# Patient Record
Sex: FEMALE | Race: WHITE | NOT HISPANIC OR LATINO | URBAN - METROPOLITAN AREA
[De-identification: names, ages, dates, MRNs, and addresses within clinical notes are randomized per-mention and may not be internally consistent; named-entity substitution may affect disease eponyms.]

---

## 2023-02-15 PROBLEM — J30.9 ALLERGIC RHINITIS: Status: ACTIVE | Noted: 2023-02-15

## 2023-02-15 PROBLEM — K57.92 ACUTE DIVERTICULITIS: Status: ACTIVE | Noted: 2023-02-15

## 2023-02-15 PROBLEM — M54.2 ANTERIOR NECK PAIN: Status: ACTIVE | Noted: 2023-02-15

## 2023-02-15 PROBLEM — E06.1 SUBACUTE THYROIDITIS: Status: ACTIVE | Noted: 2023-02-15

## 2023-02-15 PROBLEM — G57.01 PIRIFORMIS SYNDROME OF RIGHT SIDE: Status: ACTIVE | Noted: 2023-02-15

## 2023-02-15 PROBLEM — B00.9 HERPES SIMPLEX: Status: ACTIVE | Noted: 2023-02-15

## 2023-02-15 PROBLEM — R93.89 ABNORMAL THYROID ULTRASOUND: Status: ACTIVE | Noted: 2023-02-15

## 2023-02-15 PROBLEM — E03.9 ADULT HYPOTHYROIDISM: Status: ACTIVE | Noted: 2023-02-15

## 2023-02-15 PROBLEM — I10 BENIGN HYPERTENSION: Status: ACTIVE | Noted: 2023-02-15

## 2023-02-15 PROBLEM — E78.00 HYPERCHOLESTEREMIA: Status: ACTIVE | Noted: 2023-02-15

## 2023-02-15 PROBLEM — B34.9 VIRAL ILLNESS: Status: ACTIVE | Noted: 2023-02-15

## 2023-02-15 PROBLEM — M72.0 DUPUYTREN'S CONTRACTURE: Status: ACTIVE | Noted: 2023-02-15

## 2023-02-15 PROBLEM — R51.9 SINUS HEADACHE: Status: ACTIVE | Noted: 2023-02-15

## 2023-02-15 PROBLEM — M54.31 RIGHT SIDED SCIATICA: Status: ACTIVE | Noted: 2023-02-15

## 2023-02-15 PROBLEM — E04.1 THYROID NODULE: Status: ACTIVE | Noted: 2023-02-15

## 2023-02-15 PROBLEM — R07.0 THROAT DISCOMFORT: Status: ACTIVE | Noted: 2023-02-15

## 2023-02-15 PROBLEM — E66.3 OVERWEIGHT (BMI 25.0-29.9): Status: ACTIVE | Noted: 2023-02-15

## 2023-02-15 RX ORDER — CHOLECALCIFEROL (VITAMIN D3) 25 MCG
1 TABLET ORAL DAILY
COMMUNITY

## 2023-02-15 RX ORDER — METHYLPREDNISOLONE 4 MG/1
TABLET ORAL
COMMUNITY
Start: 2022-01-25 | End: 2023-04-11 | Stop reason: WASHOUT

## 2023-02-15 RX ORDER — LORATADINE 10 MG/1
1 TABLET ORAL DAILY PRN
COMMUNITY
Start: 2018-06-29

## 2023-02-15 RX ORDER — PRAVASTATIN SODIUM 10 MG/1
1 TABLET ORAL DAILY
COMMUNITY
Start: 2020-02-18 | End: 2023-04-08 | Stop reason: SDUPTHER

## 2023-02-15 RX ORDER — LEVOTHYROXINE SODIUM 50 UG/1
TABLET ORAL
COMMUNITY
Start: 2021-07-14 | End: 2023-04-08 | Stop reason: SDUPTHER

## 2023-02-15 RX ORDER — CYCLOBENZAPRINE HCL 10 MG
1 TABLET ORAL NIGHTLY
COMMUNITY
Start: 2022-01-25

## 2023-04-03 RX ORDER — SOD SULF/POT CHLORIDE/MAG SULF 1.479 G
TABLET ORAL
COMMUNITY
Start: 2022-11-30

## 2023-04-03 RX ORDER — BUTALBITAL, ACETAMINOPHEN AND CAFFEINE 300; 40; 50 MG/1; MG/1; MG/1
CAPSULE ORAL
COMMUNITY
Start: 2022-06-12

## 2023-04-03 RX ORDER — ONDANSETRON 4 MG/1
TABLET, ORALLY DISINTEGRATING ORAL
COMMUNITY
Start: 2021-05-02

## 2023-04-06 ENCOUNTER — TELEPHONE (OUTPATIENT)
Dept: PRIMARY CARE | Facility: CLINIC | Age: 63
End: 2023-04-06
Payer: MEDICARE

## 2023-04-06 NOTE — TELEPHONE ENCOUNTER
Pt coming in next week for [facility] but needed refill on  Pravastatin 10 mg. & [facility]othyroxine [facility] mcg [facility] [phone]  ty

## 2023-04-08 DIAGNOSIS — E78.00 HYPERCHOLESTEREMIA: ICD-10-CM

## 2023-04-08 DIAGNOSIS — E03.9 ADULT HYPOTHYROIDISM: Primary | ICD-10-CM

## 2023-04-08 RX ORDER — PRAVASTATIN SODIUM 10 MG/1
10 TABLET ORAL DAILY
Qty: 90 TABLET | Refills: 3 | Status: SHIPPED | OUTPATIENT
Start: 2023-04-08 | End: 2023-04-13 | Stop reason: SDUPTHER

## 2023-04-08 RX ORDER — LEVOTHYROXINE SODIUM 50 UG/1
50 TABLET ORAL
Qty: 90 TABLET | Refills: 3 | Status: SHIPPED | OUTPATIENT
Start: 2023-04-08 | End: 2023-04-11 | Stop reason: SDUPTHER

## 2023-04-11 ENCOUNTER — OFFICE VISIT (OUTPATIENT)
Dept: PRIMARY CARE | Facility: CLINIC | Age: 63
End: 2023-04-11
Payer: MEDICARE

## 2023-04-11 VITALS
DIASTOLIC BLOOD PRESSURE: 82 MMHG | HEIGHT: 60 IN | SYSTOLIC BLOOD PRESSURE: 124 MMHG | OXYGEN SATURATION: 98 % | BODY MASS INDEX: 27.52 KG/M2 | WEIGHT: 140.2 LBS | HEART RATE: 83 BPM | TEMPERATURE: 98 F

## 2023-04-11 DIAGNOSIS — E66.3 OVERWEIGHT (BMI 25.0-29.9): ICD-10-CM

## 2023-04-11 DIAGNOSIS — Z00.00 MEDICARE ANNUAL WELLNESS VISIT, INITIAL: Primary | ICD-10-CM

## 2023-04-11 DIAGNOSIS — K80.20 CALCULUS OF GALLBLADDER WITHOUT CHOLECYSTITIS WITHOUT OBSTRUCTION: ICD-10-CM

## 2023-04-11 DIAGNOSIS — E03.9 ADULT HYPOTHYROIDISM: ICD-10-CM

## 2023-04-11 DIAGNOSIS — I10 BENIGN HYPERTENSION: ICD-10-CM

## 2023-04-11 DIAGNOSIS — E78.00 HYPERCHOLESTEREMIA: ICD-10-CM

## 2023-04-11 PROCEDURE — 1036F TOBACCO NON-USER: CPT | Performed by: FAMILY MEDICINE

## 2023-04-11 PROCEDURE — 3074F SYST BP LT 130 MM HG: CPT | Performed by: FAMILY MEDICINE

## 2023-04-11 PROCEDURE — G0403 EKG FOR INITIAL PREVENT EXAM: HCPCS | Performed by: FAMILY MEDICINE

## 2023-04-11 PROCEDURE — 36415 COLL VENOUS BLD VENIPUNCTURE: CPT | Performed by: FAMILY MEDICINE

## 2023-04-11 PROCEDURE — 84443 ASSAY THYROID STIM HORMONE: CPT

## 2023-04-11 PROCEDURE — 80053 COMPREHEN METABOLIC PANEL: CPT

## 2023-04-11 PROCEDURE — 81001 URINALYSIS AUTO W/SCOPE: CPT

## 2023-04-11 PROCEDURE — 85025 COMPLETE CBC W/AUTO DIFF WBC: CPT

## 2023-04-11 PROCEDURE — G0402 INITIAL PREVENTIVE EXAM: HCPCS | Performed by: FAMILY MEDICINE

## 2023-04-11 PROCEDURE — 1160F RVW MEDS BY RX/DR IN RCRD: CPT | Performed by: FAMILY MEDICINE

## 2023-04-11 PROCEDURE — 99497 ADVNCD CARE PLAN 30 MIN: CPT | Performed by: FAMILY MEDICINE

## 2023-04-11 PROCEDURE — 80061 LIPID PANEL: CPT

## 2023-04-11 PROCEDURE — 1159F MED LIST DOCD IN RCRD: CPT | Performed by: FAMILY MEDICINE

## 2023-04-11 PROCEDURE — 99214 OFFICE O/P EST MOD 30 MIN: CPT | Performed by: FAMILY MEDICINE

## 2023-04-11 PROCEDURE — 3079F DIAST BP 80-89 MM HG: CPT | Performed by: FAMILY MEDICINE

## 2023-04-11 RX ORDER — PRAVASTATIN SODIUM 20 MG/1
TABLET ORAL
COMMUNITY
End: 2023-04-11 | Stop reason: SDUPTHER

## 2023-04-11 RX ORDER — ACETAMINOPHEN 325 MG/1
TABLET ORAL
COMMUNITY
Start: 2013-02-11

## 2023-04-11 RX ORDER — MECLIZINE HYDROCHLORIDE 25 MG/1
TABLET ORAL
COMMUNITY
Start: 2013-11-29

## 2023-04-11 RX ORDER — FLUTICASONE PROPIONATE 50 MCG
SPRAY, SUSPENSION (ML) NASAL
COMMUNITY
Start: 2012-09-26

## 2023-04-11 RX ORDER — LEVOTHYROXINE SODIUM 50 UG/1
TABLET ORAL
COMMUNITY
End: 2023-05-11 | Stop reason: SDUPTHER

## 2023-04-11 SDOH — ECONOMIC STABILITY: HOUSING INSECURITY
IN THE LAST 12 MONTHS, WAS THERE A TIME WHEN YOU DID NOT HAVE A STEADY PLACE TO SLEEP OR SLEPT IN A SHELTER (INCLUDING NOW)?: NO

## 2023-04-11 SDOH — ECONOMIC STABILITY: FOOD INSECURITY: WITHIN THE PAST 12 MONTHS, YOU WORRIED THAT YOUR FOOD WOULD RUN OUT BEFORE YOU GOT MONEY TO BUY MORE.: NEVER TRUE

## 2023-04-11 SDOH — ECONOMIC STABILITY: TRANSPORTATION INSECURITY
IN THE PAST 12 MONTHS, HAS THE LACK OF TRANSPORTATION KEPT YOU FROM MEDICAL APPOINTMENTS OR FROM GETTING MEDICATIONS?: NO

## 2023-04-11 SDOH — ECONOMIC STABILITY: TRANSPORTATION INSECURITY
IN THE PAST 12 MONTHS, HAS LACK OF TRANSPORTATION KEPT YOU FROM MEETINGS, WORK, OR FROM GETTING THINGS NEEDED FOR DAILY LIVING?: NO

## 2023-04-11 SDOH — ECONOMIC STABILITY: INCOME INSECURITY: IN THE LAST 12 MONTHS, WAS THERE A TIME WHEN YOU WERE NOT ABLE TO PAY THE MORTGAGE OR RENT ON TIME?: NO

## 2023-04-11 SDOH — ECONOMIC STABILITY: FOOD INSECURITY: WITHIN THE PAST 12 MONTHS, THE FOOD YOU BOUGHT JUST DIDN'T LAST AND YOU DIDN'T HAVE MONEY TO GET MORE.: NEVER TRUE

## 2023-04-11 ASSESSMENT — SOCIAL DETERMINANTS OF HEALTH (SDOH)
HOW HARD IS IT FOR YOU TO PAY FOR THE VERY BASICS LIKE FOOD, HOUSING, MEDICAL CARE, AND HEATING?: NOT HARD AT ALL
WITHIN THE LAST YEAR, HAVE YOU BEEN HUMILIATED OR EMOTIONALLY ABUSED IN OTHER WAYS BY YOUR PARTNER OR EX-PARTNER?: NO
WITHIN THE LAST YEAR, HAVE YOU BEEN AFRAID OF YOUR PARTNER OR EX-PARTNER?: NO
WITHIN THE LAST YEAR, HAVE YOU BEEN KICKED, HIT, SLAPPED, OR OTHERWISE PHYSICALLY HURT BY YOUR PARTNER OR EX-PARTNER?: NO
WITHIN THE LAST YEAR, HAVE TO BEEN RAPED OR FORCED TO HAVE ANY KIND OF SEXUAL ACTIVITY BY YOUR PARTNER OR EX-PARTNER?: NO

## 2023-04-11 ASSESSMENT — ENCOUNTER SYMPTOMS
RESPIRATORY NEGATIVE: 1
ARTHRALGIAS: 1
ROS GI COMMENTS: COLONOSCOPY UP-TO-DATE
OCCASIONAL FEELINGS OF UNSTEADINESS: 0
CONSTITUTIONAL NEGATIVE: 1
PSYCHIATRIC NEGATIVE: 1
BACK PAIN: 1
ENDOCRINE COMMENTS: THYROID DISEASE
CARDIOVASCULAR NEGATIVE: 1
DEPRESSION: 0
LOSS OF SENSATION IN FEET: 0
EYES NEGATIVE: 1

## 2023-04-11 ASSESSMENT — LIFESTYLE VARIABLES
HOW OFTEN DO YOU HAVE SIX OR MORE DRINKS ON ONE OCCASION: NEVER
HOW MANY STANDARD DRINKS CONTAINING ALCOHOL DO YOU HAVE ON A TYPICAL DAY: PATIENT DOES NOT DRINK
HOW OFTEN DO YOU HAVE A DRINK CONTAINING ALCOHOL: NEVER
SKIP TO QUESTIONS 9-10: 1
AUDIT-C TOTAL SCORE: 0

## 2023-04-11 ASSESSMENT — PATIENT HEALTH QUESTIONNAIRE - PHQ9
SUM OF ALL RESPONSES TO PHQ9 QUESTIONS 1 & 2: 0
1. LITTLE INTEREST OR PLEASURE IN DOING THINGS: NOT AT ALL
2. FEELING DOWN, DEPRESSED OR HOPELESS: NOT AT ALL

## 2023-04-11 NOTE — PROGRESS NOTES
Subjective   Patient ID: Jocelyne Barroso is a 63 y.o. female who presents for Annual Exam ( Assessment Welcome to medicare fasting bw/).    HPI     Review of Systems   Constitutional: Negative.    HENT: Negative.     Eyes: Negative.    Respiratory: Negative.     Cardiovascular: Negative.    Gastrointestinal:         Colonoscopy up-to-date   Endocrine:        Thyroid disease   Genitourinary: Negative.    Musculoskeletal:  Positive for arthralgias and back pain.   Psychiatric/Behavioral: Negative.         Objective   /82 (BP Location: Left arm)   Pulse 83   Temp 36.7 °C (98 °F) (Temporal)   Ht 1.524 m (5')   Wt 63.6 kg (140 lb 3.2 oz)   SpO2 98%   BMI 27.38 kg/m²     Physical Exam  Vitals and nursing note reviewed.   HENT:      Right Ear: Tympanic membrane normal.      Left Ear: Tympanic membrane normal.   Eyes:      Pupils: Pupils are equal, round, and reactive to light.   Cardiovascular:      Rate and Rhythm: Normal rate and regular rhythm.      Pulses: Normal pulses.      Heart sounds: Normal heart sounds.   Pulmonary:      Breath sounds: Normal breath sounds.   Abdominal:      Palpations: Abdomen is soft.      Comments: Some right upper quadrant tenderness without any rebound or guarding   Musculoskeletal:         General: Normal range of motion.   Neurological:      General: No focal deficit present.      Mental Status: She is alert.   Psychiatric:         Mood and Affect: Mood normal.         Behavior: Behavior normal.         Thought Content: Thought content normal.         Judgment: Judgment normal.         Assessment/Plan   Problem List Items Addressed This Visit          Circulatory    Benign hypertension    Relevant Orders    CBC and Auto Differential    Comprehensive Metabolic Panel    Urinalysis Microscopic Only       Endocrine/Metabolic    Overweight (BMI 25.0-29.9)    Adult hypothyroidism    Relevant Orders    TSH with reflex to Free T4 if abnormal       Other    Hypercholesteremia    Relevant  Orders    Lipid Panel     Other Visit Diagnoses       Medicare annual wellness visit, initial    -  Primary    Relevant Orders    ECG 12 lead    Calculus of gallbladder without cholecystitis without obstruction        Relevant Orders    US gallbladder

## 2023-04-11 NOTE — PROGRESS NOTES
Subjective   Reason for Visit: Jocelyne Barroso is an 63 y.o. female here for a Medicare Wellness visit.      Assessment Welcome to medicare fasting bw HPI    Patient Care Team:  Vitaly Boothe DO as PCP - General     Review of Systems    Objective   Vitals:  There were no vitals taken for this visit.      Physical Exam    Assessment/Plan   Problem List Items Addressed This Visit    None

## 2023-04-12 LAB
ALANINE AMINOTRANSFERASE (SGPT) (U/L) IN SER/PLAS: 28 U/L (ref 7–45)
ALBUMIN (G/DL) IN SER/PLAS: 4.4 G/DL (ref 3.4–5)
ALKALINE PHOSPHATASE (U/L) IN SER/PLAS: 116 U/L (ref 33–136)
ANION GAP IN SER/PLAS: 13 MMOL/L (ref 10–20)
ASPARTATE AMINOTRANSFERASE (SGOT) (U/L) IN SER/PLAS: 21 U/L (ref 9–39)
BASOPHILS (10*3/UL) IN BLOOD BY AUTOMATED COUNT: 0.05 X10E9/L (ref 0–0.1)
BASOPHILS/100 LEUKOCYTES IN BLOOD BY AUTOMATED COUNT: 0.8 % (ref 0–2)
BILIRUBIN TOTAL (MG/DL) IN SER/PLAS: 0.5 MG/DL (ref 0–1.2)
CALCIUM (MG/DL) IN SER/PLAS: 10.1 MG/DL (ref 8.6–10.6)
CARBON DIOXIDE, TOTAL (MMOL/L) IN SER/PLAS: 28 MMOL/L (ref 21–32)
CHLORIDE (MMOL/L) IN SER/PLAS: 106 MMOL/L (ref 98–107)
CHOLESTEROL (MG/DL) IN SER/PLAS: 248 MG/DL (ref 0–199)
CHOLESTEROL IN HDL (MG/DL) IN SER/PLAS: 61.9 MG/DL
CHOLESTEROL/HDL RATIO: 4
CREATININE (MG/DL) IN SER/PLAS: 0.68 MG/DL (ref 0.5–1.05)
EOSINOPHILS (10*3/UL) IN BLOOD BY AUTOMATED COUNT: 0.09 X10E9/L (ref 0–0.7)
EOSINOPHILS/100 LEUKOCYTES IN BLOOD BY AUTOMATED COUNT: 1.5 % (ref 0–6)
ERYTHROCYTE DISTRIBUTION WIDTH (RATIO) BY AUTOMATED COUNT: 12.9 % (ref 11.5–14.5)
ERYTHROCYTE MEAN CORPUSCULAR HEMOGLOBIN CONCENTRATION (G/DL) BY AUTOMATED: 31.2 G/DL (ref 32–36)
ERYTHROCYTE MEAN CORPUSCULAR VOLUME (FL) BY AUTOMATED COUNT: 96 FL (ref 80–100)
ERYTHROCYTES (10*6/UL) IN BLOOD BY AUTOMATED COUNT: 4.97 X10E12/L (ref 4–5.2)
GFR FEMALE: >90 ML/MIN/1.73M2
GLUCOSE (MG/DL) IN SER/PLAS: 89 MG/DL (ref 74–99)
HEMATOCRIT (%) IN BLOOD BY AUTOMATED COUNT: 47.5 % (ref 36–46)
HEMOGLOBIN (G/DL) IN BLOOD: 14.8 G/DL (ref 12–16)
IMMATURE GRANULOCYTES/100 LEUKOCYTES IN BLOOD BY AUTOMATED COUNT: 0.2 % (ref 0–0.9)
LDL: 165 MG/DL (ref 0–99)
LEUKOCYTES (10*3/UL) IN BLOOD BY AUTOMATED COUNT: 6 X10E9/L (ref 4.4–11.3)
LYMPHOCYTES (10*3/UL) IN BLOOD BY AUTOMATED COUNT: 1.79 X10E9/L (ref 1.2–4.8)
LYMPHOCYTES/100 LEUKOCYTES IN BLOOD BY AUTOMATED COUNT: 29.7 % (ref 13–44)
MONOCYTES (10*3/UL) IN BLOOD BY AUTOMATED COUNT: 0.42 X10E9/L (ref 0.1–1)
MONOCYTES/100 LEUKOCYTES IN BLOOD BY AUTOMATED COUNT: 7 % (ref 2–10)
MUCUS, URINE: NORMAL /LPF
NEUTROPHILS (10*3/UL) IN BLOOD BY AUTOMATED COUNT: 3.66 X10E9/L (ref 1.2–7.7)
NEUTROPHILS/100 LEUKOCYTES IN BLOOD BY AUTOMATED COUNT: 60.8 % (ref 40–80)
NRBC (PER 100 WBCS) BY AUTOMATED COUNT: 0 /100 WBC (ref 0–0)
PLATELETS (10*3/UL) IN BLOOD AUTOMATED COUNT: 259 X10E9/L (ref 150–450)
POTASSIUM (MMOL/L) IN SER/PLAS: 4.6 MMOL/L (ref 3.5–5.3)
PROTEIN TOTAL: 7 G/DL (ref 6.4–8.2)
RBC, URINE: NORMAL /HPF (ref 0–5)
SODIUM (MMOL/L) IN SER/PLAS: 142 MMOL/L (ref 136–145)
THYROTROPIN (MIU/L) IN SER/PLAS BY DETECTION LIMIT <= 0.05 MIU/L: 2 MIU/L (ref 0.44–3.98)
TRIGLYCERIDE (MG/DL) IN SER/PLAS: 108 MG/DL (ref 0–149)
UREA NITROGEN (MG/DL) IN SER/PLAS: 14 MG/DL (ref 6–23)
VLDL: 22 MG/DL (ref 0–40)
WBC, URINE: NORMAL /HPF (ref 0–5)

## 2023-04-13 ENCOUNTER — TELEPHONE (OUTPATIENT)
Dept: PRIMARY CARE | Facility: CLINIC | Age: 63
End: 2023-04-13
Payer: MEDICARE

## 2023-04-13 DIAGNOSIS — E78.00 HYPERCHOLESTEREMIA: ICD-10-CM

## 2023-04-13 RX ORDER — PRAVASTATIN SODIUM 10 MG/1
20 TABLET ORAL DAILY
Qty: 90 TABLET | Refills: 3 | Status: SHIPPED | OUTPATIENT
Start: 2023-04-13 | End: 2023-04-14 | Stop reason: SDUPTHER

## 2023-04-13 NOTE — TELEPHONE ENCOUNTER
Pt called and states that you will have to [facility] that you are raising the dosage on pt pravastatin.

## 2023-04-14 DIAGNOSIS — E78.00 HYPERCHOLESTEREMIA: ICD-10-CM

## 2023-04-17 RX ORDER — PRAVASTATIN SODIUM 10 MG/1
20 TABLET ORAL DAILY
Qty: 90 TABLET | Refills: 3 | Status: SHIPPED | OUTPATIENT
Start: 2023-04-17 | End: 2023-04-19 | Stop reason: SDUPTHER

## 2023-04-18 ENCOUNTER — TELEPHONE (OUTPATIENT)
Dept: PRIMARY CARE | Facility: CLINIC | Age: 63
End: 2023-04-18
Payer: MEDICARE

## 2023-04-18 DIAGNOSIS — K80.20 CALCULUS OF GALLBLADDER WITHOUT CHOLECYSTITIS WITHOUT OBSTRUCTION: Primary | ICD-10-CM

## 2023-04-19 DIAGNOSIS — E78.00 HYPERCHOLESTEREMIA: ICD-10-CM

## 2023-04-19 RX ORDER — PRAVASTATIN SODIUM 20 MG/1
20 TABLET ORAL DAILY
Qty: 90 TABLET | Refills: 3 | Status: SHIPPED | OUTPATIENT
Start: 2023-04-19 | End: 2024-04-01

## 2023-04-19 NOTE — TELEPHONE ENCOUNTER
Pt called and states that you have to send in a new [facility] for Pravastatin 20mg due to the will not fill the prescription without a new one

## 2023-05-11 DIAGNOSIS — E03.9 ADULT HYPOTHYROIDISM: Primary | ICD-10-CM

## 2023-05-11 RX ORDER — LEVOTHYROXINE SODIUM 50 UG/1
50 TABLET ORAL
Qty: 90 TABLET | Refills: 3 | Status: SHIPPED | OUTPATIENT
Start: 2023-05-11 | End: 2024-04-15

## 2023-07-25 ENCOUNTER — TELEPHONE (OUTPATIENT)
Dept: PRIMARY CARE | Facility: CLINIC | Age: 63
End: 2023-07-25
Payer: MEDICARE

## 2023-07-25 PROBLEM — M17.9 OSTEOARTHRITIS OF KNEE: Status: ACTIVE | Noted: 2023-07-25

## 2023-07-25 PROBLEM — R10.31 RIGHT LOWER QUADRANT ABDOMINAL PAIN: Status: ACTIVE | Noted: 2023-07-25

## 2023-07-25 PROBLEM — H91.90 HL (HEARING LOSS): Status: ACTIVE | Noted: 2023-07-25

## 2023-07-25 PROBLEM — K63.5 COLON POLYP: Status: ACTIVE | Noted: 2022-12-09

## 2023-07-25 PROBLEM — K82.8 GALLBLADDER SLUDGE: Status: ACTIVE | Noted: 2023-07-25

## 2023-07-25 PROBLEM — R10.11 ABDOMINAL PAIN, RUQ (RIGHT UPPER QUADRANT): Status: ACTIVE | Noted: 2023-07-25

## 2023-07-25 PROBLEM — Z86.73 HISTORY OF STROKE: Status: ACTIVE | Noted: 2023-07-25

## 2023-07-25 RX ORDER — CALCIUM CARBONATE 600 MG
1500 TABLET ORAL
COMMUNITY

## 2023-07-25 RX ORDER — PRAVASTATIN SODIUM 10 MG/1
1 TABLET ORAL DAILY
COMMUNITY
Start: 2020-02-18

## 2023-07-25 RX ORDER — MELOXICAM 7.5 MG/1
TABLET ORAL
COMMUNITY
Start: 2023-06-06

## 2023-07-25 NOTE — TELEPHONE ENCOUNTER
Pt was to you couple months ago & then to dr. Cotter & had various tests.  We do have her scheduled 8/15 but she would like to come in sooner for advise?  He did not find anything.  She is still having pain.  Please advise

## 2023-08-15 ENCOUNTER — OFFICE VISIT (OUTPATIENT)
Dept: PRIMARY CARE | Facility: CLINIC | Age: 63
End: 2023-08-15
Payer: MEDICARE

## 2023-08-15 VITALS
WEIGHT: 144 LBS | OXYGEN SATURATION: 98 % | DIASTOLIC BLOOD PRESSURE: 82 MMHG | SYSTOLIC BLOOD PRESSURE: 120 MMHG | HEIGHT: 60 IN | TEMPERATURE: 98 F | HEART RATE: 76 BPM | BODY MASS INDEX: 28.27 KG/M2

## 2023-08-15 DIAGNOSIS — K57.30 DIVERTICULOSIS OF COLON WITHOUT DIVERTICULITIS: Chronic | ICD-10-CM

## 2023-08-15 DIAGNOSIS — R10.11 ABDOMINAL PAIN, RUQ (RIGHT UPPER QUADRANT): ICD-10-CM

## 2023-08-15 DIAGNOSIS — I10 BENIGN HYPERTENSION: Primary | ICD-10-CM

## 2023-08-15 DIAGNOSIS — K82.8 GALLBLADDER SLUDGE: ICD-10-CM

## 2023-08-15 PROCEDURE — 1036F TOBACCO NON-USER: CPT | Performed by: FAMILY MEDICINE

## 2023-08-15 PROCEDURE — 1125F AMNT PAIN NOTED PAIN PRSNT: CPT | Performed by: FAMILY MEDICINE

## 2023-08-15 PROCEDURE — 3079F DIAST BP 80-89 MM HG: CPT | Performed by: FAMILY MEDICINE

## 2023-08-15 PROCEDURE — 99214 OFFICE O/P EST MOD 30 MIN: CPT | Performed by: FAMILY MEDICINE

## 2023-08-15 PROCEDURE — 1159F MED LIST DOCD IN RCRD: CPT | Performed by: FAMILY MEDICINE

## 2023-08-15 PROCEDURE — 3074F SYST BP LT 130 MM HG: CPT | Performed by: FAMILY MEDICINE

## 2023-08-15 PROCEDURE — 1160F RVW MEDS BY RX/DR IN RCRD: CPT | Performed by: FAMILY MEDICINE

## 2023-08-15 ASSESSMENT — ENCOUNTER SYMPTOMS
DEPRESSION: 0
BLOOD IN STOOL: 0
ABDOMINAL PAIN: 1
CONSTIPATION: 0
NAUSEA: 1
OCCASIONAL FEELINGS OF UNSTEADINESS: 0
DIARRHEA: 0
LOSS OF SENSATION IN FEET: 0

## 2023-08-15 ASSESSMENT — PAIN SCALES - GENERAL: PAINLEVEL: 7

## 2023-08-17 ENCOUNTER — LAB (OUTPATIENT)
Dept: LAB | Facility: LAB | Age: 63
End: 2023-08-17
Payer: MEDICARE

## 2023-08-17 DIAGNOSIS — R10.11 ABDOMINAL PAIN, RUQ (RIGHT UPPER QUADRANT): ICD-10-CM

## 2023-08-17 DIAGNOSIS — K82.8 GALLBLADDER SLUDGE: ICD-10-CM

## 2023-08-17 LAB
CREATININE (MG/DL) IN SER/PLAS: 0.79 MG/DL (ref 0.5–1.05)
GFR FEMALE: 83 ML/MIN/1.73M2

## 2023-08-17 PROCEDURE — 82565 ASSAY OF CREATININE: CPT

## 2023-08-17 PROCEDURE — 36415 COLL VENOUS BLD VENIPUNCTURE: CPT

## 2023-08-21 ENCOUNTER — TELEPHONE (OUTPATIENT)
Dept: PRIMARY CARE | Facility: CLINIC | Age: 63
End: 2023-08-21
Payer: MEDICARE

## 2023-08-21 NOTE — TELEPHONE ENCOUNTER
Pt called regarding bw done 8/17 -dr. RIVERS pt.  She is going for mri on pancreas, gall bladder, etc. On sept. 2  Please advise

## 2023-09-12 ENCOUNTER — TELEPHONE (OUTPATIENT)
Dept: PRIMARY CARE | Facility: CLINIC | Age: 63
End: 2023-09-12
Payer: MEDICARE

## 2023-09-12 NOTE — RESULT ENCOUNTER NOTE
Spoke with pt and she states that she is still having pain and does not know what to do. Pt would like to know what to do

## 2023-11-20 ENCOUNTER — TELEPHONE (OUTPATIENT)
Dept: PRIMARY CARE | Facility: CLINIC | Age: 63
End: 2023-11-20
Payer: MEDICARE

## 2023-11-20 DIAGNOSIS — U07.1 COVID: Primary | ICD-10-CM

## 2023-11-20 RX ORDER — NIRMATRELVIR AND RITONAVIR 300-100 MG
3 KIT ORAL 2 TIMES DAILY
Qty: 30 TABLET | Refills: 0 | Status: SHIPPED | OUTPATIENT
Start: 2023-11-20 | End: 2023-11-25

## 2023-11-20 NOTE — TELEPHONE ENCOUNTER
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 & was in recently but has tested positive for covid today.    Would like paxlovid to [facility]-[phone]  Allergy to amoxicillin,aspirin,cipro,doxy,erythromycin,morphine,statins.  Please advise   Ty

## 2024-01-22 ENCOUNTER — TELEPHONE (OUTPATIENT)
Dept: PRIMARY CARE | Facility: CLINIC | Age: 64
End: 2024-01-22
Payer: MEDICARE

## 2024-01-22 DIAGNOSIS — S61.209A OPEN WOUND OF FINGER, INITIAL ENCOUNTER: Primary | ICD-10-CM

## 2024-01-22 RX ORDER — SULFAMETHOXAZOLE AND TRIMETHOPRIM 800; 160 MG/1; MG/1
1 TABLET ORAL 2 TIMES DAILY
Qty: 20 TABLET | Refills: 0 | Status: SHIPPED | OUTPATIENT
Start: 2024-01-22 | End: 2024-02-01

## 2024-02-19 ENCOUNTER — TELEPHONE (OUTPATIENT)
Dept: PRIMARY CARE | Facility: CLINIC | Age: 64
End: 2024-02-19
Payer: MEDICARE

## 2024-02-19 DIAGNOSIS — J10.1 INFLUENZA A: Primary | ICD-10-CM

## 2024-02-19 RX ORDER — OSELTAMIVIR PHOSPHATE 75 MG/1
75 CAPSULE ORAL 2 TIMES DAILY
Qty: 10 CAPSULE | Refills: 0 | Status: SHIPPED | OUTPATIENT
Start: 2024-02-19 | End: 2024-02-24

## 2024-02-19 NOTE — TELEPHONE ENCOUNTER
Pt was here within the year but is having congestion, fever, chills,cough.  Around son who  Tested postive for flu a.  Was on amoxicillin for tooth infection-stopped Friday.   [phone]  Allergy to amoxicillin,aspirin,cipro,doxy,erythromycin,morphine,statins  Ty

## 2024-04-01 DIAGNOSIS — E78.00 HYPERCHOLESTEREMIA: ICD-10-CM

## 2024-04-01 RX ORDER — PRAVASTATIN SODIUM 20 MG/1
20 TABLET ORAL DAILY
Qty: 90 TABLET | Refills: 3 | Status: SHIPPED | OUTPATIENT
Start: 2024-04-01

## 2024-07-18 ENCOUNTER — APPOINTMENT (OUTPATIENT)
Dept: PRIMARY CARE | Facility: CLINIC | Age: 64
End: 2024-07-18
Payer: MEDICARE

## 2024-08-13 ENCOUNTER — APPOINTMENT (OUTPATIENT)
Dept: PRIMARY CARE | Facility: CLINIC | Age: 64
End: 2024-08-13
Payer: MEDICARE

## 2024-08-15 ENCOUNTER — APPOINTMENT (OUTPATIENT)
Dept: PRIMARY CARE | Facility: CLINIC | Age: 64
End: 2024-08-15
Payer: MEDICARE

## 2024-08-29 ENCOUNTER — TELEPHONE (OUTPATIENT)
Dept: PRIMARY CARE | Facility: CLINIC | Age: 64
End: 2024-08-29
Payer: MEDICARE

## 2024-08-29 DIAGNOSIS — N39.0 URINARY TRACT INFECTION WITHOUT HEMATURIA, SITE UNSPECIFIED: Primary | ICD-10-CM

## 2024-08-29 RX ORDER — NITROFURANTOIN 25; 75 MG/1; MG/1
100 CAPSULE ORAL 2 TIMES DAILY
Qty: 14 CAPSULE | Refills: 0 | Status: SHIPPED | OUTPATIENT
Start: 2024-08-29 | End: 2024-09-05

## 2024-08-29 NOTE — TELEPHONE ENCOUNTER
Pt called and states that she is having symptoms of a UTI and would like to know if you could place a culture. Pt states that she is also in a lot of pain and would like something sent to the pharmacy .   no

## 2024-08-30 ENCOUNTER — LAB (OUTPATIENT)
Dept: LAB | Facility: LAB | Age: 64
End: 2024-08-30
Payer: MEDICARE

## 2024-08-30 DIAGNOSIS — N39.0 URINARY TRACT INFECTION WITHOUT HEMATURIA, SITE UNSPECIFIED: ICD-10-CM

## 2024-08-30 PROCEDURE — 87086 URINE CULTURE/COLONY COUNT: CPT

## 2024-08-31 LAB — BACTERIA UR CULT: NORMAL

## 2024-10-07 ENCOUNTER — LAB (OUTPATIENT)
Dept: LAB | Facility: LAB | Age: 64
End: 2024-10-07
Payer: MEDICARE

## 2024-10-07 ENCOUNTER — APPOINTMENT (OUTPATIENT)
Dept: PRIMARY CARE | Facility: CLINIC | Age: 64
End: 2024-10-07
Payer: MEDICARE

## 2024-10-07 VITALS
HEIGHT: 60 IN | WEIGHT: 135.2 LBS | OXYGEN SATURATION: 97 % | SYSTOLIC BLOOD PRESSURE: 140 MMHG | BODY MASS INDEX: 26.55 KG/M2 | HEART RATE: 81 BPM | TEMPERATURE: 97.8 F | DIASTOLIC BLOOD PRESSURE: 80 MMHG

## 2024-10-07 DIAGNOSIS — I10 BENIGN HYPERTENSION: ICD-10-CM

## 2024-10-07 DIAGNOSIS — E03.9 ADULT HYPOTHYROIDISM: ICD-10-CM

## 2024-10-07 DIAGNOSIS — H90.0 CONDUCTIVE HEARING LOSS, BILATERAL: ICD-10-CM

## 2024-10-07 DIAGNOSIS — Z00.00 MEDICARE ANNUAL WELLNESS VISIT, INITIAL: Primary | ICD-10-CM

## 2024-10-07 DIAGNOSIS — Z00.00 MEDICARE ANNUAL WELLNESS VISIT, INITIAL: ICD-10-CM

## 2024-10-07 DIAGNOSIS — E78.00 HYPERCHOLESTEREMIA: ICD-10-CM

## 2024-10-07 DIAGNOSIS — N39.0 URINARY TRACT INFECTION WITHOUT HEMATURIA, SITE UNSPECIFIED: ICD-10-CM

## 2024-10-07 LAB
ALBUMIN SERPL BCP-MCNC: 4.8 G/DL (ref 3.4–5)
ALP SERPL-CCNC: 108 U/L (ref 33–136)
ALT SERPL W P-5'-P-CCNC: 26 U/L (ref 7–45)
ANION GAP SERPL CALC-SCNC: 12 MMOL/L (ref 10–20)
AST SERPL W P-5'-P-CCNC: 22 U/L (ref 9–39)
BASOPHILS # BLD AUTO: 0.05 X10*3/UL (ref 0–0.1)
BASOPHILS NFR BLD AUTO: 0.7 %
BILIRUB SERPL-MCNC: 0.6 MG/DL (ref 0–1.2)
BUN SERPL-MCNC: 14 MG/DL (ref 6–23)
CALCIUM SERPL-MCNC: 10.3 MG/DL (ref 8.6–10.6)
CHLORIDE SERPL-SCNC: 103 MMOL/L (ref 98–107)
CHOLEST SERPL-MCNC: 229 MG/DL (ref 0–199)
CHOLESTEROL/HDL RATIO: 4.1
CO2 SERPL-SCNC: 30 MMOL/L (ref 21–32)
CREAT SERPL-MCNC: 0.74 MG/DL (ref 0.5–1.05)
EGFRCR SERPLBLD CKD-EPI 2021: 89 ML/MIN/1.73M*2
EOSINOPHIL # BLD AUTO: 0.07 X10*3/UL (ref 0–0.7)
EOSINOPHIL NFR BLD AUTO: 1 %
ERYTHROCYTE [DISTWIDTH] IN BLOOD BY AUTOMATED COUNT: 12 % (ref 11.5–14.5)
GLUCOSE SERPL-MCNC: 99 MG/DL (ref 74–99)
HCT VFR BLD AUTO: 45.4 % (ref 36–46)
HCV AB SER QL: NONREACTIVE
HDLC SERPL-MCNC: 56.3 MG/DL
HGB BLD-MCNC: 14.9 G/DL (ref 12–16)
IMM GRANULOCYTES # BLD AUTO: 0.02 X10*3/UL (ref 0–0.7)
IMM GRANULOCYTES NFR BLD AUTO: 0.3 % (ref 0–0.9)
LDLC SERPL CALC-MCNC: 149 MG/DL
LYMPHOCYTES # BLD AUTO: 1.75 X10*3/UL (ref 1.2–4.8)
LYMPHOCYTES NFR BLD AUTO: 26 %
MCH RBC QN AUTO: 29.8 PG (ref 26–34)
MCHC RBC AUTO-ENTMCNC: 32.8 G/DL (ref 32–36)
MCV RBC AUTO: 91 FL (ref 80–100)
MONOCYTES # BLD AUTO: 0.45 X10*3/UL (ref 0.1–1)
MONOCYTES NFR BLD AUTO: 6.7 %
MUCOUS THREADS #/AREA URNS AUTO: NORMAL /LPF
NEUTROPHILS # BLD AUTO: 4.38 X10*3/UL (ref 1.2–7.7)
NEUTROPHILS NFR BLD AUTO: 65.3 %
NON HDL CHOLESTEROL: 173 MG/DL (ref 0–149)
NRBC BLD-RTO: 0 /100 WBCS (ref 0–0)
PLATELET # BLD AUTO: 312 X10*3/UL (ref 150–450)
POTASSIUM SERPL-SCNC: 4.3 MMOL/L (ref 3.5–5.3)
PROT SERPL-MCNC: 7.6 G/DL (ref 6.4–8.2)
RBC # BLD AUTO: 5 X10*6/UL (ref 4–5.2)
RBC #/AREA URNS AUTO: NORMAL /HPF
SODIUM SERPL-SCNC: 141 MMOL/L (ref 136–145)
TRIGL SERPL-MCNC: 120 MG/DL (ref 0–149)
TSH SERPL-ACNC: 2.25 MIU/L (ref 0.44–3.98)
VLDL: 24 MG/DL (ref 0–40)
WBC # BLD AUTO: 6.7 X10*3/UL (ref 4.4–11.3)
WBC #/AREA URNS AUTO: NORMAL /HPF

## 2024-10-07 PROCEDURE — 3008F BODY MASS INDEX DOCD: CPT | Performed by: FAMILY MEDICINE

## 2024-10-07 PROCEDURE — 1124F ACP DISCUSS-NO DSCNMKR DOCD: CPT | Performed by: FAMILY MEDICINE

## 2024-10-07 PROCEDURE — 86803 HEPATITIS C AB TEST: CPT

## 2024-10-07 PROCEDURE — 36415 COLL VENOUS BLD VENIPUNCTURE: CPT

## 2024-10-07 PROCEDURE — 99397 PER PM REEVAL EST PAT 65+ YR: CPT | Performed by: FAMILY MEDICINE

## 2024-10-07 PROCEDURE — 1126F AMNT PAIN NOTED NONE PRSNT: CPT | Performed by: FAMILY MEDICINE

## 2024-10-07 PROCEDURE — 1160F RVW MEDS BY RX/DR IN RCRD: CPT | Performed by: FAMILY MEDICINE

## 2024-10-07 PROCEDURE — 80061 LIPID PANEL: CPT

## 2024-10-07 PROCEDURE — 87086 URINE CULTURE/COLONY COUNT: CPT

## 2024-10-07 PROCEDURE — 84443 ASSAY THYROID STIM HORMONE: CPT

## 2024-10-07 PROCEDURE — 3077F SYST BP >= 140 MM HG: CPT | Performed by: FAMILY MEDICINE

## 2024-10-07 PROCEDURE — 99497 ADVNCD CARE PLAN 30 MIN: CPT | Performed by: FAMILY MEDICINE

## 2024-10-07 PROCEDURE — 85025 COMPLETE CBC W/AUTO DIFF WBC: CPT

## 2024-10-07 PROCEDURE — 80053 COMPREHEN METABOLIC PANEL: CPT

## 2024-10-07 PROCEDURE — 1159F MED LIST DOCD IN RCRD: CPT | Performed by: FAMILY MEDICINE

## 2024-10-07 PROCEDURE — 1036F TOBACCO NON-USER: CPT | Performed by: FAMILY MEDICINE

## 2024-10-07 PROCEDURE — 1170F FXNL STATUS ASSESSED: CPT | Performed by: FAMILY MEDICINE

## 2024-10-07 PROCEDURE — 81001 URINALYSIS AUTO W/SCOPE: CPT

## 2024-10-07 PROCEDURE — G0439 PPPS, SUBSEQ VISIT: HCPCS | Performed by: FAMILY MEDICINE

## 2024-10-07 PROCEDURE — 3079F DIAST BP 80-89 MM HG: CPT | Performed by: FAMILY MEDICINE

## 2024-10-07 RX ORDER — PENICILLIN V POTASSIUM 500 MG/1
TABLET, FILM COATED ORAL
COMMUNITY
Start: 2024-05-22 | End: 2024-10-07 | Stop reason: ALTCHOICE

## 2024-10-07 RX ORDER — AMOXICILLIN 500 MG/1
CAPSULE ORAL
COMMUNITY
Start: 2024-05-17 | End: 2024-10-07 | Stop reason: ALTCHOICE

## 2024-10-07 ASSESSMENT — GERIATRIC MINI NUTRITIONAL ASSESSMENT (MNA)
A HAS FOOD INTAKE DECLINED OVER THE PAST 3 MONTHS DUE TO LOSS OF APPETITE, DIGESTIVE PROBLEMS, CHEWING OR SWALLOWING DIFFICULTIES?: NO DECREASE IN FOOD INTAKE
E NEUROPSYCHOLOGICAL PROBLEMS: NO PSYCHOLOGICAL PROBLEMS
B WEIGHT LOSS DURING THE LAST 3 MONTHS: NO WEIGHT LOSS
C GENERAL MOBILITY: GOES OUT
D HAS SUFFERED PSYCHOLOGICAL STRESS OR ACUTE DISEASE IN THE PAST 3 MONTHS?: NO

## 2024-10-07 ASSESSMENT — ACTIVITIES OF DAILY LIVING (ADL)
BATHING: INDEPENDENT
BATHING: INDEPENDENT
NEEDS ASSISTANCE WITH FOOD: INDEPENDENT
DOING HOUSEWORK: INDEPENDENT
EATING: INDEPENDENT
USING TELEPHONE: INDEPENDENT
WALKS IN HOME: INDEPENDENT
STIL DRIVING: YES
ADEQUATE_TO_COMPLETE_ADL: YES
DRESSING YOURSELF: INDEPENDENT
FEEDING: INDEPENDENT
TOILETING: INDEPENDENT
PATIENT'S MEMORY ADEQUATE TO SAFELY COMPLETE DAILY ACTIVITIES?: YES
PREPARING MEALS: INDEPENDENT
TOILETING: INDEPENDENT
JUDGMENT_ADEQUATE_SAFELY_COMPLETE_DAILY_ACTIVITIES: YES
MANAGING FINANCES: INDEPENDENT
FEEDING YOURSELF: INDEPENDENT
GROOMING: INDEPENDENT
JUDGMENT_ADEQUATE_SAFELY_COMPLETE_DAILY_ACTIVITIES: YES
USING TRANSPORTATION: INDEPENDENT
ADEQUATE_TO_COMPLETE_ADL: YES
GROCERY SHOPPING: INDEPENDENT
TAKING MEDICATION: INDEPENDENT
DRESSING: INDEPENDENT

## 2024-10-07 ASSESSMENT — ENCOUNTER SYMPTOMS
CARDIOVASCULAR NEGATIVE: 1
LOSS OF SENSATION IN FEET: 0
NEUROLOGICAL NEGATIVE: 1
ENDOCRINE NEGATIVE: 1
GASTROINTESTINAL NEGATIVE: 1
DEPRESSION: 0
ARTHRALGIAS: 1
CONSTITUTIONAL NEGATIVE: 1
OCCASIONAL FEELINGS OF UNSTEADINESS: 0
RESPIRATORY NEGATIVE: 1
ROS GI COMMENTS: DR MAHAJAN
PSYCHIATRIC NEGATIVE: 1
ENDOCRINE COMMENTS: THYROID DISEASE

## 2024-10-07 ASSESSMENT — ANXIETY QUESTIONNAIRES
3. WORRYING TOO MUCH ABOUT DIFFERENT THINGS: NOT AT ALL
5. BEING SO RESTLESS THAT IT IS HARD TO SIT STILL: NOT AT ALL
6. BECOMING EASILY ANNOYED OR IRRITABLE: NOT AT ALL
GAD7 TOTAL SCORE: 0
7. FEELING AFRAID AS IF SOMETHING AWFUL MIGHT HAPPEN: NOT AT ALL
1. FEELING NERVOUS, ANXIOUS, OR ON EDGE: NOT AT ALL
4. TROUBLE RELAXING: NOT AT ALL
2. NOT BEING ABLE TO STOP OR CONTROL WORRYING: NOT AT ALL

## 2024-10-07 ASSESSMENT — PAIN SCALES - GENERAL: PAINLEVEL: 0-NO PAIN

## 2024-10-07 NOTE — PROGRESS NOTES
Subjective   Patient ID: Jocelyne Barroso is a 64 y.o. female who presents for Annual Exam (Annual medicare wellness fasting bw).    HPI     Review of Systems   Constitutional: Negative.    HENT:  Positive for hearing loss.    Respiratory: Negative.     Cardiovascular: Negative.    Gastrointestinal: Negative.         Dr Maher   Endocrine: Negative.         Thyroid disease   Genitourinary: Negative.         Sees gyn   Musculoskeletal:  Positive for arthralgias.   Neurological: Negative.    Psychiatric/Behavioral: Negative.         Objective   /80 (BP Location: Left arm)   Pulse 81   Temp 36.6 °C (97.8 °F) (Temporal)   Ht 1.524 m (5')   Wt 61.3 kg (135 lb 3.2 oz)   SpO2 97%   BMI 26.40 kg/m²     Physical Exam  Vitals and nursing note reviewed.   Constitutional:       Appearance: Normal appearance.   HENT:      Right Ear: Tympanic membrane normal.      Left Ear: Tympanic membrane normal.      Ears:      Comments: Bilateral hearing aids     Mouth/Throat:      Pharynx: Oropharynx is clear.   Cardiovascular:      Rate and Rhythm: Normal rate and regular rhythm.      Pulses: Normal pulses.      Heart sounds: Normal heart sounds.   Pulmonary:      Breath sounds: Normal breath sounds.   Abdominal:      Palpations: Abdomen is soft.   Musculoskeletal:         General: Normal range of motion.   Neurological:      General: No focal deficit present.      Mental Status: She is alert and oriented to person, place, and time.   Psychiatric:         Mood and Affect: Mood normal.         Behavior: Behavior normal.         Assessment/Plan patient seen here for an annual Medicare wellness exam.  We reviewed her questionnaire she is agreeable to her responses.  We did discuss advanced directives.  She has no significant difficulty with depression or anxiety.  We are drawing her lab work here today we will see her back in a year  Problem List Items Addressed This Visit             ICD-10-CM    Benign hypertension I10     Relevant Orders    CBC and Auto Differential    Comprehensive Metabolic Panel    Hypercholesteremia E78.00    Relevant Orders    Lipid Panel    Adult hypothyroidism E03.9    Relevant Orders    TSH with reflex to Free T4 if abnormal    Hearing loss of both ears H91.93     Other Visit Diagnoses         Codes    Medicare annual wellness visit, initial    -  Primary Z00.00    Relevant Orders    Hepatitis C antibody    BMI 26.0-26.9,adult     Z68.26    Urinary tract infection without hematuria, site unspecified     N39.0    Relevant Orders    Microscopic Only, Urine    Urine Culture

## 2024-10-07 NOTE — ACP (ADVANCE CARE PLANNING)
Confirming Previous Code Status:   Advance Care Planning Note     Discussion Date: 10/07/24   Discussion Participants: patient    The patient wishes to discuss Advance Care Planning today and the following is a brief summary of our discussion.     Patient has capacity to make their own medical decisions: Yes  Health Care Agent/Surrogate Decision Maker documented in chart: Yes    Documents on file and valid:  Advance Directive/Living Will: No   Health Care Power of : No  Other: none    Communication of Medical Status/Prognosis:   yes     Communication of Treatment Goals/Options:   yes     Treatment Decisions  Goals of Care: survival is paramount regardless of prognosis, treatment outcome, or burden   yes  Follow Up Plan  no  Team Members  myself  Time Statement: Total face to face time spent on advance care planning was 16 minutes with 16 minutes spent in counseling, including the explanation.    Vitaly Boothe DO  10/7/2024 9:49 AM

## 2024-10-08 LAB — BACTERIA UR CULT: NORMAL

## 2024-10-25 ENCOUNTER — TELEPHONE (OUTPATIENT)
Dept: PRIMARY CARE | Facility: CLINIC | Age: 64
End: 2024-10-25
Payer: MEDICARE

## 2024-12-04 ENCOUNTER — TELEPHONE (OUTPATIENT)
Dept: PRIMARY CARE | Facility: CLINIC | Age: 64
End: 2024-12-04
Payer: MEDICARE

## 2024-12-04 DIAGNOSIS — U07.1 COVID-19: Primary | ICD-10-CM

## 2024-12-04 RX ORDER — NIRMATRELVIR AND RITONAVIR 300-100 MG
3 KIT ORAL 2 TIMES DAILY
Qty: 30 TABLET | Refills: 0 | Status: SHIPPED | OUTPATIENT
Start: 2024-12-04 | End: 2024-12-09

## 2024-12-04 NOTE — TELEPHONE ENCOUNTER
Pt is  V & in recently but tested positive for covid today.  She would like paxlovid sent to [facility]-[phone]  Allergy to amoxicillin,aspirin,cipro,doxy,erythromycin,morphine,statins  Ty

## 2025-01-07 ENCOUNTER — TELEPHONE (OUTPATIENT)
Dept: PRIMARY CARE | Facility: CLINIC | Age: 65
End: 2025-01-07
Payer: MEDICARE

## 2025-01-07 DIAGNOSIS — K57.92 DIVERTICULITIS: Primary | ICD-10-CM

## 2025-01-07 RX ORDER — ONDANSETRON 4 MG/1
4 TABLET, FILM COATED ORAL EVERY 8 HOURS PRN
Qty: 20 TABLET | Refills: 0 | Status: SHIPPED | OUTPATIENT
Start: 2025-01-07 | End: 2025-01-14

## 2025-01-07 NOTE — TELEPHONE ENCOUNTER
Pt was in er Friday at  for diverticulitis-was given 2 abx-.  Was told to follow-up  With pcp.  Not feeling well-nausea, pain & dizzy.  You had told her to stay off flagyl since Sunday.  Please advise  Ty

## 2025-01-09 ENCOUNTER — OFFICE VISIT (OUTPATIENT)
Dept: PRIMARY CARE | Facility: CLINIC | Age: 65
End: 2025-01-09
Payer: MEDICARE

## 2025-01-09 VITALS
DIASTOLIC BLOOD PRESSURE: 74 MMHG | BODY MASS INDEX: 26 KG/M2 | HEART RATE: 65 BPM | HEIGHT: 60 IN | SYSTOLIC BLOOD PRESSURE: 112 MMHG | WEIGHT: 132.4 LBS | TEMPERATURE: 97.6 F | OXYGEN SATURATION: 97 %

## 2025-01-09 DIAGNOSIS — E03.9 ADULT HYPOTHYROIDISM: ICD-10-CM

## 2025-01-09 DIAGNOSIS — I10 BENIGN HYPERTENSION: Primary | ICD-10-CM

## 2025-01-09 DIAGNOSIS — K57.92 ACUTE DIVERTICULITIS: ICD-10-CM

## 2025-01-09 PROCEDURE — 1159F MED LIST DOCD IN RCRD: CPT | Performed by: FAMILY MEDICINE

## 2025-01-09 PROCEDURE — 1125F AMNT PAIN NOTED PAIN PRSNT: CPT | Performed by: FAMILY MEDICINE

## 2025-01-09 PROCEDURE — 99214 OFFICE O/P EST MOD 30 MIN: CPT | Performed by: FAMILY MEDICINE

## 2025-01-09 PROCEDURE — 3078F DIAST BP <80 MM HG: CPT | Performed by: FAMILY MEDICINE

## 2025-01-09 PROCEDURE — 1036F TOBACCO NON-USER: CPT | Performed by: FAMILY MEDICINE

## 2025-01-09 PROCEDURE — G2211 COMPLEX E/M VISIT ADD ON: HCPCS | Performed by: FAMILY MEDICINE

## 2025-01-09 PROCEDURE — 1160F RVW MEDS BY RX/DR IN RCRD: CPT | Performed by: FAMILY MEDICINE

## 2025-01-09 PROCEDURE — 3074F SYST BP LT 130 MM HG: CPT | Performed by: FAMILY MEDICINE

## 2025-01-09 PROCEDURE — 3008F BODY MASS INDEX DOCD: CPT | Performed by: FAMILY MEDICINE

## 2025-01-09 RX ORDER — OXYCODONE HYDROCHLORIDE AND ACETAMINOPHEN 5; 325 MG/1; MG/1
TABLET ORAL
COMMUNITY
Start: 2025-01-03

## 2025-01-09 RX ORDER — CIPROFLOXACIN HYDROCHLORIDE 500 MG/1
500 TABLET, FILM COATED ORAL
COMMUNITY
Start: 2025-01-03 | End: 2025-01-13

## 2025-01-09 RX ORDER — METRONIDAZOLE 500 MG/1
1 TABLET ORAL
COMMUNITY
Start: 2025-01-03

## 2025-01-09 ASSESSMENT — PAIN SCALES - GENERAL: PAINLEVEL_OUTOF10: 5

## 2025-01-09 ASSESSMENT — ENCOUNTER SYMPTOMS
ENDOCRINE NEGATIVE: 1
CONSTITUTIONAL NEGATIVE: 1
RESPIRATORY NEGATIVE: 1

## 2025-01-09 NOTE — PROGRESS NOTES
Subjective   Patient ID: Jocelyne Barroso is a 64 y.o. female who presents for Hospital Follow-up ( -1/3/2025 diverticulitis).    HPI     Review of Systems   Constitutional: Negative.    HENT: Negative.     Respiratory: Negative.     Gastrointestinal:         Patient was seen in [facility] emergency room on January 3 with acute diverticulitis.  She is on metronidazole and Cipro and now that she has something for nausea she is able to tolerate this medication she is feeling better   Endocrine: Negative.    Genitourinary: Negative.        Objective   /74 (BP Location: Left arm)   Pulse 65   Temp 36.4 °C (97.6 °F) (Temporal)   Ht 1.524 m (5')   Wt 60.1 kg (132 lb 6.4 oz)   SpO2 97%   BMI 25.86 kg/m²     Physical Exam  Vitals and nursing note reviewed.   Constitutional:       Appearance: Normal appearance.   Cardiovascular:      Rate and Rhythm: Normal rate and regular rhythm.      Pulses: Normal pulses.      Heart sounds: Normal heart sounds.   Pulmonary:      Breath sounds: Normal breath sounds.   Abdominal:      Palpations: Abdomen is soft.      Comments: Bilateral lower quadrant tenderness no rebound or guarding   Neurological:      General: No focal deficit present.      Mental Status: She is alert and oriented to person, place, and time.   Psychiatric:         Mood and Affect: Mood normal.         Behavior: Behavior normal.         Assessment/Plan patient seen here to follow-up after having been in the emergency room at [facility] with acute diverticulitis she is getting better she is able to eat now she has some loose stools.  I have asked her to finish her antibiotics and let me know if she does not improve.  She had a colonoscopy done in 2022.  Will see her back as needed  Problem List Items Addressed This Visit             ICD-10-CM    Acute diverticulitis K57.92    Benign hypertension - Primary I10    Adult hypothyroidism E03.9

## 2025-01-20 ENCOUNTER — TELEPHONE (OUTPATIENT)
Dept: PRIMARY CARE | Facility: CLINIC | Age: 65
End: 2025-01-20
Payer: MEDICARE

## 2025-01-20 DIAGNOSIS — K57.92 ACUTE DIVERTICULITIS: Primary | ICD-10-CM

## 2025-01-20 NOTE — TELEPHONE ENCOUNTER
Pt was here couple weeks ago for diverticulitis er follow-up.  She took abx & is still having problems.  Can you please put in referral for gastro.  Jonathan marcus?  I will fax  Ty

## 2025-01-30 ENCOUNTER — PATIENT OUTREACH (OUTPATIENT)
Dept: PRIMARY CARE | Facility: CLINIC | Age: 65
End: 2025-01-30
Payer: MEDICARE

## 2025-01-30 RX ORDER — PANTOPRAZOLE SODIUM 40 MG/1
40 TABLET, DELAYED RELEASE ORAL
COMMUNITY
Start: 2025-01-27

## 2025-01-30 RX ORDER — DICYCLOMINE HYDROCHLORIDE 10 MG/1
10 CAPSULE ORAL
COMMUNITY
Start: 2025-01-27 | End: 2025-02-26

## 2025-01-30 RX ORDER — ONDANSETRON 4 MG/1
4 TABLET, FILM COATED ORAL
COMMUNITY
Start: 2025-01-27

## 2025-01-30 RX ORDER — AMOXICILLIN AND CLAVULANATE POTASSIUM 875; 125 MG/1; MG/1
875 TABLET, FILM COATED ORAL
COMMUNITY
Start: 2025-01-27 | End: 2025-02-01

## 2025-01-30 NOTE — PROGRESS NOTES
Discharge Facility: [facility]  Discharge Diagnosis: diverticulitis, abd pain  Admission Date: 1/24/25  Discharge Date: 1/28/25    PCP Appointment Date: declined  Specialist Appointment Date: will call asap for appt  Hospital Encounter and Summary Linked: Yes  See discharge assessment below for further details    Engagement  Call Start Time: 0940 (1/30/2025 11:08 AM)    Medications  Medications reviewed with patient/caregiver?: Yes (1/30/2025 11:08 AM)  Is the patient having any side effects they believe may be caused by any medication additions or changes?: No (1/30/2025 11:08 AM)  Does the patient have all medications ordered at discharge?: Yes (1/30/2025 11:08 AM)  Care Management Interventions: Provided patient education (1/30/2025 11:08 AM)  Prescription Comments: on course of augmentin, bentyl, zofran, protonix (1/30/2025 11:08 AM)  Is the patient taking all medications as directed (includes completed medication regime)?: Yes (1/30/2025 11:08 AM)  Care Management Interventions: Provided patient education (1/30/2025 11:08 AM)  Medication Comments: No issues with medication (1/30/2025 11:08 AM)    Appointments  Does the patient have a primary care provider?: Yes (1/30/2025 11:08 AM)  Has the patient kept scheduled appointments due by today?: Yes (1/30/2025 11:08 AM)  Care Management Interventions: Advised to schedule with specialist (1/30/2025 11:08 AM)    Patient Teaching  Does the patient have access to their discharge instructions?: Yes (1/30/2025 11:08 AM)  Care Management Interventions: Reviewed instructions with patient (1/30/2025 11:08 AM)  What is the patient's perception of their health status since discharge?: Improving (1/30/2025 11:08 AM)  Is the patient/caregiver able to teach back the hierarchy of who to call/visit for symptoms/problems? PCP, Specialist, Home Health nurse, Urgent Care, ED, 911: Yes (1/30/2025 11:08 AM)  Patient/Caregiver Education Comments: Aware to seek care with and return of abd  pain (1/30/2025 11:08 AM)    Wrap Up  Wrap Up Additional Comments: Jocelyne is feeling much better since coming home, plans on following with Dr Maher. Declined appt at this time with Dr Boothe (1/30/2025 11:08 AM)  Call End Time: 0945 (1/30/2025 11:08 AM)

## 2025-03-21 DIAGNOSIS — E78.00 HYPERCHOLESTEREMIA: ICD-10-CM

## 2025-03-21 RX ORDER — PRAVASTATIN SODIUM 20 MG/1
20 TABLET ORAL DAILY
Qty: 90 TABLET | Refills: 3 | Status: SHIPPED | OUTPATIENT
Start: 2025-03-21

## 2025-04-02 ENCOUNTER — TELEPHONE (OUTPATIENT)
Dept: PRIMARY CARE | Facility: CLINIC | Age: 65
End: 2025-04-02
Payer: MEDICARE

## 2025-04-02 NOTE — TELEPHONE ENCOUNTER
C/O  Top crack of buttocks; patient has a rash, it's itchy and red since yesterday.   And today it's really irritated.  Been like that since yesterday.     Pharm:  [facility] [phone]

## 2025-04-10 DIAGNOSIS — E03.9 ADULT HYPOTHYROIDISM: ICD-10-CM

## 2025-04-10 RX ORDER — LEVOTHYROXINE SODIUM 50 UG/1
50 TABLET ORAL
Qty: 90 TABLET | Refills: 3 | Status: SHIPPED | OUTPATIENT
Start: 2025-04-10

## 2025-06-20 ENCOUNTER — APPOINTMENT (OUTPATIENT)
Dept: RADIOLOGY | Facility: HOSPITAL | Age: 65
End: 2025-06-20
Payer: MEDICARE

## 2025-06-20 ENCOUNTER — TELEPHONE (OUTPATIENT)
Dept: PRIMARY CARE | Facility: CLINIC | Age: 65
End: 2025-06-20
Payer: MEDICARE

## 2025-06-20 ENCOUNTER — HOSPITAL ENCOUNTER (OUTPATIENT)
Facility: HOSPITAL | Age: 65
Setting detail: OBSERVATION
Discharge: HOME | End: 2025-06-21
Attending: EMERGENCY MEDICINE | Admitting: STUDENT IN AN ORGANIZED HEALTH CARE EDUCATION/TRAINING PROGRAM
Payer: MEDICARE

## 2025-06-20 ENCOUNTER — APPOINTMENT (OUTPATIENT)
Dept: CARDIOLOGY | Facility: HOSPITAL | Age: 65
End: 2025-06-20
Payer: MEDICARE

## 2025-06-20 DIAGNOSIS — K21.00 GASTROESOPHAGEAL REFLUX DISEASE WITH ESOPHAGITIS WITHOUT HEMORRHAGE: ICD-10-CM

## 2025-06-20 DIAGNOSIS — R07.9 CHEST PAIN, UNSPECIFIED TYPE: Primary | ICD-10-CM

## 2025-06-20 LAB
ALBUMIN SERPL BCP-MCNC: 4.6 G/DL (ref 3.4–5)
ALP SERPL-CCNC: 89 U/L (ref 33–136)
ALT SERPL W P-5'-P-CCNC: 21 U/L (ref 7–45)
ANION GAP SERPL CALC-SCNC: 13 MMOL/L (ref 10–20)
APPEARANCE UR: CLEAR
AST SERPL W P-5'-P-CCNC: 19 U/L (ref 9–39)
BASOPHILS # BLD AUTO: 0.05 X10*3/UL (ref 0–0.1)
BASOPHILS NFR BLD AUTO: 0.7 %
BILIRUB SERPL-MCNC: 0.7 MG/DL (ref 0–1.2)
BILIRUB UR STRIP.AUTO-MCNC: NEGATIVE MG/DL
BNP SERPL-MCNC: 21 PG/ML (ref 0–99)
BUN SERPL-MCNC: 14 MG/DL (ref 6–23)
CALCIUM SERPL-MCNC: 9.8 MG/DL (ref 8.6–10.3)
CARDIAC TROPONIN I PNL SERPL HS: 8 NG/L (ref 0–13)
CARDIAC TROPONIN I PNL SERPL HS: <3 NG/L (ref 0–13)
CARDIAC TROPONIN I PNL SERPL HS: <3 NG/L (ref 0–13)
CHLORIDE SERPL-SCNC: 101 MMOL/L (ref 98–107)
CHOLEST SERPL-MCNC: 235 MG/DL (ref 0–199)
CHOLESTEROL/HDL RATIO: 3.9
CO2 SERPL-SCNC: 29 MMOL/L (ref 21–32)
COLOR UR: COLORLESS
CREAT SERPL-MCNC: 0.76 MG/DL (ref 0.5–1.05)
D DIMER PPP FEU-MCNC: 297 NG/ML FEU
EGFRCR SERPLBLD CKD-EPI 2021: 86 ML/MIN/1.73M*2
EOSINOPHIL # BLD AUTO: 0.22 X10*3/UL (ref 0–0.7)
EOSINOPHIL NFR BLD AUTO: 2.9 %
ERYTHROCYTE [DISTWIDTH] IN BLOOD BY AUTOMATED COUNT: 12.2 % (ref 11.5–14.5)
EST. AVERAGE GLUCOSE BLD GHB EST-MCNC: 108 MG/DL
GLUCOSE SERPL-MCNC: 121 MG/DL (ref 74–99)
GLUCOSE UR STRIP.AUTO-MCNC: NORMAL MG/DL
HBA1C MFR BLD: 5.4 % (ref ?–5.7)
HCT VFR BLD AUTO: 47.4 % (ref 36–46)
HDLC SERPL-MCNC: 60.4 MG/DL
HGB BLD-MCNC: 15.1 G/DL (ref 12–16)
IMM GRANULOCYTES # BLD AUTO: 0.03 X10*3/UL (ref 0–0.7)
IMM GRANULOCYTES NFR BLD AUTO: 0.4 % (ref 0–0.9)
INR PPP: 1 (ref 0.9–1.1)
KETONES UR STRIP.AUTO-MCNC: NEGATIVE MG/DL
LACTATE SERPL-SCNC: 1.6 MMOL/L (ref 0.4–2)
LDLC SERPL CALC-MCNC: 142 MG/DL
LEUKOCYTE ESTERASE UR QL STRIP.AUTO: NEGATIVE
LIPASE SERPL-CCNC: 28 U/L (ref 9–82)
LYMPHOCYTES # BLD AUTO: 1.76 X10*3/UL (ref 1.2–4.8)
LYMPHOCYTES NFR BLD AUTO: 23.2 %
MAGNESIUM SERPL-MCNC: 2.15 MG/DL (ref 1.6–2.4)
MCH RBC QN AUTO: 29.7 PG (ref 26–34)
MCHC RBC AUTO-ENTMCNC: 31.9 G/DL (ref 32–36)
MCV RBC AUTO: 93 FL (ref 80–100)
MONOCYTES # BLD AUTO: 0.53 X10*3/UL (ref 0.1–1)
MONOCYTES NFR BLD AUTO: 7 %
NEUTROPHILS # BLD AUTO: 4.98 X10*3/UL (ref 1.2–7.7)
NEUTROPHILS NFR BLD AUTO: 65.8 %
NITRITE UR QL STRIP.AUTO: NEGATIVE
NON HDL CHOLESTEROL: 175 MG/DL (ref 0–149)
NRBC BLD-RTO: 0 /100 WBCS (ref 0–0)
PH UR STRIP.AUTO: 5.5 [PH]
PLATELET # BLD AUTO: 282 X10*3/UL (ref 150–450)
POTASSIUM SERPL-SCNC: 4 MMOL/L (ref 3.5–5.3)
PROT SERPL-MCNC: 7.4 G/DL (ref 6.4–8.2)
PROT UR STRIP.AUTO-MCNC: NEGATIVE MG/DL
PROTHROMBIN TIME: 11 SECONDS (ref 9.8–12.4)
RBC # BLD AUTO: 5.09 X10*6/UL (ref 4–5.2)
RBC # UR STRIP.AUTO: NEGATIVE MG/DL
SODIUM SERPL-SCNC: 139 MMOL/L (ref 136–145)
SP GR UR STRIP.AUTO: 1
TRIGL SERPL-MCNC: 164 MG/DL (ref 0–149)
UROBILINOGEN UR STRIP.AUTO-MCNC: NORMAL MG/DL
VLDL: 33 MG/DL (ref 0–40)
WBC # BLD AUTO: 7.6 X10*3/UL (ref 4.4–11.3)

## 2025-06-20 PROCEDURE — 85025 COMPLETE CBC W/AUTO DIFF WBC: CPT | Performed by: EMERGENCY MEDICINE

## 2025-06-20 PROCEDURE — 83880 ASSAY OF NATRIURETIC PEPTIDE: CPT | Performed by: EMERGENCY MEDICINE

## 2025-06-20 PROCEDURE — 93005 ELECTROCARDIOGRAM TRACING: CPT

## 2025-06-20 PROCEDURE — 36415 COLL VENOUS BLD VENIPUNCTURE: CPT | Performed by: EMERGENCY MEDICINE

## 2025-06-20 PROCEDURE — 83605 ASSAY OF LACTIC ACID: CPT | Performed by: EMERGENCY MEDICINE

## 2025-06-20 PROCEDURE — 83735 ASSAY OF MAGNESIUM: CPT | Performed by: EMERGENCY MEDICINE

## 2025-06-20 PROCEDURE — 71045 X-RAY EXAM CHEST 1 VIEW: CPT

## 2025-06-20 PROCEDURE — 84484 ASSAY OF TROPONIN QUANT: CPT | Performed by: EMERGENCY MEDICINE

## 2025-06-20 PROCEDURE — G0378 HOSPITAL OBSERVATION PER HR: HCPCS

## 2025-06-20 PROCEDURE — 80061 LIPID PANEL: CPT | Performed by: STUDENT IN AN ORGANIZED HEALTH CARE EDUCATION/TRAINING PROGRAM

## 2025-06-20 PROCEDURE — 2500000001 HC RX 250 WO HCPCS SELF ADMINISTERED DRUGS (ALT 637 FOR MEDICARE OP): Performed by: STUDENT IN AN ORGANIZED HEALTH CARE EDUCATION/TRAINING PROGRAM

## 2025-06-20 PROCEDURE — 2500000005 HC RX 250 GENERAL PHARMACY W/O HCPCS: Performed by: STUDENT IN AN ORGANIZED HEALTH CARE EDUCATION/TRAINING PROGRAM

## 2025-06-20 PROCEDURE — 71045 X-RAY EXAM CHEST 1 VIEW: CPT | Performed by: RADIOLOGY

## 2025-06-20 PROCEDURE — 80053 COMPREHEN METABOLIC PANEL: CPT | Performed by: EMERGENCY MEDICINE

## 2025-06-20 PROCEDURE — 99222 1ST HOSP IP/OBS MODERATE 55: CPT | Performed by: STUDENT IN AN ORGANIZED HEALTH CARE EDUCATION/TRAINING PROGRAM

## 2025-06-20 PROCEDURE — 81003 URINALYSIS AUTO W/O SCOPE: CPT | Performed by: EMERGENCY MEDICINE

## 2025-06-20 PROCEDURE — 85379 FIBRIN DEGRADATION QUANT: CPT | Performed by: EMERGENCY MEDICINE

## 2025-06-20 PROCEDURE — 83690 ASSAY OF LIPASE: CPT | Performed by: EMERGENCY MEDICINE

## 2025-06-20 PROCEDURE — 83036 HEMOGLOBIN GLYCOSYLATED A1C: CPT | Mod: PARLAB | Performed by: STUDENT IN AN ORGANIZED HEALTH CARE EDUCATION/TRAINING PROGRAM

## 2025-06-20 PROCEDURE — 85610 PROTHROMBIN TIME: CPT | Performed by: EMERGENCY MEDICINE

## 2025-06-20 PROCEDURE — 99285 EMERGENCY DEPT VISIT HI MDM: CPT | Performed by: EMERGENCY MEDICINE

## 2025-06-20 RX ORDER — ACETAMINOPHEN 650 MG/1
650 SUPPOSITORY RECTAL EVERY 4 HOURS PRN
Status: DISCONTINUED | OUTPATIENT
Start: 2025-06-20 | End: 2025-06-21 | Stop reason: HOSPADM

## 2025-06-20 RX ORDER — TALC
3 POWDER (GRAM) TOPICAL NIGHTLY
Status: DISCONTINUED | OUTPATIENT
Start: 2025-06-20 | End: 2025-06-21 | Stop reason: HOSPADM

## 2025-06-20 RX ORDER — PRAVASTATIN SODIUM 20 MG/1
20 TABLET ORAL NIGHTLY
Status: DISCONTINUED | OUTPATIENT
Start: 2025-06-20 | End: 2025-06-21 | Stop reason: HOSPADM

## 2025-06-20 RX ORDER — ONDANSETRON 4 MG/1
4 TABLET, FILM COATED ORAL EVERY 8 HOURS PRN
Status: DISCONTINUED | OUTPATIENT
Start: 2025-06-20 | End: 2025-06-21 | Stop reason: HOSPADM

## 2025-06-20 RX ORDER — ONDANSETRON HYDROCHLORIDE 2 MG/ML
4 INJECTION, SOLUTION INTRAVENOUS EVERY 8 HOURS PRN
Status: DISCONTINUED | OUTPATIENT
Start: 2025-06-20 | End: 2025-06-21 | Stop reason: HOSPADM

## 2025-06-20 RX ORDER — ASCORBIC ACID 500 MG
500 TABLET ORAL DAILY
COMMUNITY

## 2025-06-20 RX ORDER — ACETAMINOPHEN 325 MG/1
650 TABLET ORAL EVERY 4 HOURS PRN
Status: DISCONTINUED | OUTPATIENT
Start: 2025-06-20 | End: 2025-06-21 | Stop reason: HOSPADM

## 2025-06-20 RX ORDER — ACETAMINOPHEN 160 MG/5ML
650 SOLUTION ORAL EVERY 4 HOURS PRN
Status: DISCONTINUED | OUTPATIENT
Start: 2025-06-20 | End: 2025-06-21 | Stop reason: HOSPADM

## 2025-06-20 RX ORDER — PANTOPRAZOLE SODIUM 40 MG/1
40 TABLET, DELAYED RELEASE ORAL
Status: DISCONTINUED | OUTPATIENT
Start: 2025-06-20 | End: 2025-06-21 | Stop reason: HOSPADM

## 2025-06-20 RX ADMIN — Medication 3 MG: at 20:51

## 2025-06-20 RX ADMIN — PRAVASTATIN SODIUM 20 MG: 20 TABLET ORAL at 20:51

## 2025-06-20 RX ADMIN — PANTOPRAZOLE SODIUM 40 MG: 40 TABLET, DELAYED RELEASE ORAL at 18:17

## 2025-06-20 SDOH — ECONOMIC STABILITY: HOUSING INSECURITY: IN THE LAST 12 MONTHS, WAS THERE A TIME WHEN YOU WERE NOT ABLE TO PAY THE MORTGAGE OR RENT ON TIME?: NO

## 2025-06-20 SDOH — SOCIAL STABILITY: SOCIAL INSECURITY: WITHIN THE LAST YEAR, HAVE YOU BEEN AFRAID OF YOUR PARTNER OR EX-PARTNER?: NO

## 2025-06-20 SDOH — SOCIAL STABILITY: SOCIAL INSECURITY
WITHIN THE LAST YEAR, HAVE YOU BEEN KICKED, HIT, SLAPPED, OR OTHERWISE PHYSICALLY HURT BY YOUR PARTNER OR EX-PARTNER?: NO

## 2025-06-20 SDOH — SOCIAL STABILITY: SOCIAL INSECURITY: WITHIN THE LAST YEAR, HAVE YOU BEEN HUMILIATED OR EMOTIONALLY ABUSED IN OTHER WAYS BY YOUR PARTNER OR EX-PARTNER?: NO

## 2025-06-20 SDOH — ECONOMIC STABILITY: HOUSING INSECURITY: AT ANY TIME IN THE PAST 12 MONTHS, WERE YOU HOMELESS OR LIVING IN A SHELTER (INCLUDING NOW)?: NO

## 2025-06-20 SDOH — SOCIAL STABILITY: SOCIAL INSECURITY: HAVE YOU HAD THOUGHTS OF HARMING ANYONE ELSE?: NO

## 2025-06-20 SDOH — SOCIAL STABILITY: SOCIAL INSECURITY: WERE YOU ABLE TO COMPLETE ALL THE BEHAVIORAL HEALTH SCREENINGS?: YES

## 2025-06-20 SDOH — ECONOMIC STABILITY: FOOD INSECURITY: WITHIN THE PAST 12 MONTHS, THE FOOD YOU BOUGHT JUST DIDN'T LAST AND YOU DIDN'T HAVE MONEY TO GET MORE.: NEVER TRUE

## 2025-06-20 SDOH — SOCIAL STABILITY: SOCIAL INSECURITY
WITHIN THE LAST YEAR, HAVE YOU BEEN RAPED OR FORCED TO HAVE ANY KIND OF SEXUAL ACTIVITY BY YOUR PARTNER OR EX-PARTNER?: NO

## 2025-06-20 SDOH — SOCIAL STABILITY: SOCIAL INSECURITY: DOES ANYONE TRY TO KEEP YOU FROM HAVING/CONTACTING OTHER FRIENDS OR DOING THINGS OUTSIDE YOUR HOME?: NO

## 2025-06-20 SDOH — ECONOMIC STABILITY: HOUSING INSECURITY: IN THE PAST 12 MONTHS, HOW MANY TIMES HAVE YOU MOVED WHERE YOU WERE LIVING?: 1

## 2025-06-20 SDOH — SOCIAL STABILITY: SOCIAL INSECURITY: ARE THERE ANY APPARENT SIGNS OF INJURIES/BEHAVIORS THAT COULD BE RELATED TO ABUSE/NEGLECT?: NO

## 2025-06-20 SDOH — ECONOMIC STABILITY: FOOD INSECURITY: HOW HARD IS IT FOR YOU TO PAY FOR THE VERY BASICS LIKE FOOD, HOUSING, MEDICAL CARE, AND HEATING?: NOT VERY HARD

## 2025-06-20 SDOH — ECONOMIC STABILITY: FOOD INSECURITY: WITHIN THE PAST 12 MONTHS, YOU WORRIED THAT YOUR FOOD WOULD RUN OUT BEFORE YOU GOT THE MONEY TO BUY MORE.: NEVER TRUE

## 2025-06-20 SDOH — ECONOMIC STABILITY: INCOME INSECURITY: IN THE PAST 12 MONTHS HAS THE ELECTRIC, GAS, OIL, OR WATER COMPANY THREATENED TO SHUT OFF SERVICES IN YOUR HOME?: NO

## 2025-06-20 SDOH — SOCIAL STABILITY: SOCIAL INSECURITY: ABUSE: ADULT

## 2025-06-20 SDOH — SOCIAL STABILITY: SOCIAL INSECURITY: DO YOU FEEL UNSAFE GOING BACK TO THE PLACE WHERE YOU ARE LIVING?: NO

## 2025-06-20 SDOH — SOCIAL STABILITY: SOCIAL INSECURITY: DO YOU FEEL ANYONE HAS EXPLOITED OR TAKEN ADVANTAGE OF YOU FINANCIALLY OR OF YOUR PERSONAL PROPERTY?: NO

## 2025-06-20 SDOH — ECONOMIC STABILITY: TRANSPORTATION INSECURITY: IN THE PAST 12 MONTHS, HAS LACK OF TRANSPORTATION KEPT YOU FROM MEDICAL APPOINTMENTS OR FROM GETTING MEDICATIONS?: NO

## 2025-06-20 SDOH — SOCIAL STABILITY: SOCIAL INSECURITY: HAVE YOU HAD ANY THOUGHTS OF HARMING ANYONE ELSE?: NO

## 2025-06-20 SDOH — SOCIAL STABILITY: SOCIAL INSECURITY: ARE YOU OR HAVE YOU BEEN THREATENED OR ABUSED PHYSICALLY, EMOTIONALLY, OR SEXUALLY BY ANYONE?: NO

## 2025-06-20 SDOH — SOCIAL STABILITY: SOCIAL INSECURITY: HAS ANYONE EVER THREATENED TO HURT YOUR FAMILY OR YOUR PETS?: NO

## 2025-06-20 ASSESSMENT — COGNITIVE AND FUNCTIONAL STATUS - GENERAL
MOBILITY SCORE: 24
PATIENT BASELINE BEDBOUND: NO
DAILY ACTIVITIY SCORE: 24

## 2025-06-20 ASSESSMENT — LIFESTYLE VARIABLES
AUDIT-C TOTAL SCORE: 0
SKIP TO QUESTIONS 9-10: 1
SUBSTANCE_ABUSE_PAST_12_MONTHS: NO
HOW OFTEN DO YOU HAVE A DRINK CONTAINING ALCOHOL: NEVER
AUDIT-C TOTAL SCORE: 0
HOW OFTEN DO YOU HAVE 6 OR MORE DRINKS ON ONE OCCASION: NEVER
PRESCIPTION_ABUSE_PAST_12_MONTHS: NO
HOW MANY STANDARD DRINKS CONTAINING ALCOHOL DO YOU HAVE ON A TYPICAL DAY: PATIENT DOES NOT DRINK

## 2025-06-20 ASSESSMENT — HEART SCORE
HEART SCORE: 3
TROPONIN: LESS THAN OR EQUAL TO NORMAL LIMIT
RISK FACTORS: 1-2 RISK FACTORS
ECG: NORMAL
AGE: 65+
HISTORY: SLIGHTLY SUSPICIOUS

## 2025-06-20 ASSESSMENT — ACTIVITIES OF DAILY LIVING (ADL)
FEEDING YOURSELF: INDEPENDENT
LACK_OF_TRANSPORTATION: NO
PATIENT'S MEMORY ADEQUATE TO SAFELY COMPLETE DAILY ACTIVITIES?: YES
WALKS IN HOME: INDEPENDENT
JUDGMENT_ADEQUATE_SAFELY_COMPLETE_DAILY_ACTIVITIES: YES
LACK_OF_TRANSPORTATION: NO
ADEQUATE_TO_COMPLETE_ADL: YES
HEARING - LEFT EAR: FUNCTIONAL
HEARING - RIGHT EAR: FUNCTIONAL
GROOMING: INDEPENDENT
TOILETING: INDEPENDENT
DRESSING YOURSELF: INDEPENDENT
BATHING: INDEPENDENT

## 2025-06-20 ASSESSMENT — PAIN - FUNCTIONAL ASSESSMENT: PAIN_FUNCTIONAL_ASSESSMENT: 0-10

## 2025-06-20 ASSESSMENT — PAIN SCALES - GENERAL: PAINLEVEL_OUTOF10: 0 - NO PAIN

## 2025-06-20 NOTE — ED TRIAGE NOTES
Pt presents to ED c/o pressure in chest and upper back that has been occurring intermittently for the past few days. Pt denies CP or pressure on arrival. Pt denies SOB. Pt states the pressure occurs while sitting down and not with exertion. Denies cough, fevers, chills.
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
## 2025-06-20 NOTE — TELEPHONE ENCOUNTER
Pt is having chest and back pressure for a few days. She hasn't been seen at an [facility] or ED. Pt is also having leg pain from ankle to knee. Should I advise her to go to the ED for the chest pressure or would you see her?    Pts #[phone]

## 2025-06-20 NOTE — CARE PLAN
The patient's goals for the shift include      The clinical goals for the shift include pt will report no CP      Problem: Pain - Adult  Goal: Verbalizes/displays adequate comfort level or baseline comfort level  Outcome: Progressing
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
## 2025-06-21 ENCOUNTER — APPOINTMENT (OUTPATIENT)
Dept: CARDIOLOGY | Facility: HOSPITAL | Age: 65
End: 2025-06-21
Payer: MEDICARE

## 2025-06-21 VITALS
WEIGHT: 135 LBS | OXYGEN SATURATION: 94 % | HEART RATE: 75 BPM | RESPIRATION RATE: 18 BRPM | DIASTOLIC BLOOD PRESSURE: 62 MMHG | BODY MASS INDEX: 26.5 KG/M2 | SYSTOLIC BLOOD PRESSURE: 115 MMHG | TEMPERATURE: 96.8 F | HEIGHT: 60 IN

## 2025-06-21 PROCEDURE — G0378 HOSPITAL OBSERVATION PER HR: HCPCS

## 2025-06-21 PROCEDURE — 93005 ELECTROCARDIOGRAM TRACING: CPT

## 2025-06-21 PROCEDURE — 2500000001 HC RX 250 WO HCPCS SELF ADMINISTERED DRUGS (ALT 637 FOR MEDICARE OP): Performed by: STUDENT IN AN ORGANIZED HEALTH CARE EDUCATION/TRAINING PROGRAM

## 2025-06-21 PROCEDURE — 99239 HOSP IP/OBS DSCHRG MGMT >30: CPT | Performed by: STUDENT IN AN ORGANIZED HEALTH CARE EDUCATION/TRAINING PROGRAM

## 2025-06-21 PROCEDURE — 2500000005 HC RX 250 GENERAL PHARMACY W/O HCPCS: Performed by: STUDENT IN AN ORGANIZED HEALTH CARE EDUCATION/TRAINING PROGRAM

## 2025-06-21 PROCEDURE — 2500000002 HC RX 250 W HCPCS SELF ADMINISTERED DRUGS (ALT 637 FOR MEDICARE OP, ALT 636 FOR OP/ED): Performed by: STUDENT IN AN ORGANIZED HEALTH CARE EDUCATION/TRAINING PROGRAM

## 2025-06-21 RX ORDER — PANTOPRAZOLE SODIUM 40 MG/1
40 TABLET, DELAYED RELEASE ORAL
Qty: 30 TABLET | Refills: 1 | Status: SHIPPED | OUTPATIENT
Start: 2025-06-22 | End: 2025-08-21

## 2025-06-21 RX ORDER — LIDOCAINE 560 MG/1
1 PATCH PERCUTANEOUS; TOPICAL; TRANSDERMAL DAILY
Status: DISCONTINUED | OUTPATIENT
Start: 2025-06-21 | End: 2025-06-21 | Stop reason: HOSPADM

## 2025-06-21 RX ORDER — LEVOTHYROXINE SODIUM 50 UG/1
50 TABLET ORAL DAILY
Status: DISCONTINUED | OUTPATIENT
Start: 2025-06-21 | End: 2025-06-21 | Stop reason: HOSPADM

## 2025-06-21 RX ADMIN — LEVOTHYROXINE SODIUM 50 MCG: 0.05 TABLET ORAL at 10:59

## 2025-06-21 RX ADMIN — PANTOPRAZOLE SODIUM 40 MG: 40 TABLET, DELAYED RELEASE ORAL at 06:15

## 2025-06-21 RX ADMIN — LIDOCAINE 1 PATCH: 4 PATCH TOPICAL at 09:48

## 2025-06-21 ASSESSMENT — COGNITIVE AND FUNCTIONAL STATUS - GENERAL
DAILY ACTIVITIY SCORE: 24
MOBILITY SCORE: 24

## 2025-06-21 ASSESSMENT — PAIN SCALES - GENERAL
PAINLEVEL_OUTOF10: 0 - NO PAIN
PAINLEVEL_OUTOF10: 0 - NO PAIN

## 2025-06-21 NOTE — CARE PLAN
The patient's goals for the shift include      The clinical goals for the shift include Pt will remain free from chest pain throughout shift    Over the shift, the patient did not make progress toward the following goals. Barriers to progression include acute illness. Recommendations to address these barriers include communication.

## 2025-06-21 NOTE — CARE PLAN
The patient's goals for the shift include  sleep, comfort and safety    The clinical goals for the shift include patient will not have any chest pain for entire shift.

## 2025-06-21 NOTE — NURSING NOTE
Discharge paperwork went over with pt, all questions answered at this time. IV and tele removed. Pt to be dc home with .

## 2025-06-21 NOTE — PROGRESS NOTES
06/21/25 1111   Discharge Planning   Living Arrangements Spouse/significant other   Support Systems Spouse/significant other   Type of Residence Private residence   Do you have animals or pets at home? No   Who is requesting discharge planning? Provider   Expected Discharge Disposition Home   Stroke Family Assessment   Stroke Family Assessment Needed No   Intensity of Service   Intensity of Service 0-30 min     Met with pt, pt admit for chest pain--negative .  Pt has no needs, plan is for home today.   Lorraine Taylor RN [facility]

## 2025-06-21 NOTE — DISCHARGE INSTRUCTIONS
Follow-up with your primary care provider next week    A referral has been sent through our  service to a cardiologist, if you do not receive a call next week from our  service to schedule an appointment please talk to your primary care physician about a referral when you see them during your next appointment    Begin taking pantoprazole daily, a prescription has been sent to your pharmacy    If you have any recurrence of your chest pain, especially with exertion, please return to the hospital for evaluation.

## 2025-06-21 NOTE — DISCHARGE SUMMARY
Discharge Diagnosis  Chest pain           Issues Requiring Follow-Up  Follow-up with cardiology, referral has been sent    Begin taking pantoprazole and follow-up with your primary care physician    Discharge Meds     Medication List      START taking these medications     pantoprazole 40 mg EC tablet; Commonly known as: ProtoNix; Take 1 tablet   (40 mg) by mouth once daily in the morning. Take before meals. Do not   crush, chew, or split.; Start taking on: June 22, 2025     CONTINUE taking these medications     ascorbic acid 500 mg tablet; Commonly known as: Vitamin C   calcium carbonate 600 mg calcium (1,500 mg) tablet   cholecalciferol 25 mcg (1,000 units) tablet; Commonly known as: Vitamin   D-3   levothyroxine 50 mcg tablet; Commonly known as: Synthroid, Levoxyl; TAKE   1 TABLET DAILY IN THE MORNING BEFORE A MEAL   loratadine 10 mg tablet; Commonly known as: Claritin   NON FORMULARY   pravastatin 20 mg tablet; Commonly known as: Pravachol; TAKE 1 TABLET   DAILY       Test Results Pending At Discharge  Pending Labs       Order Current Status    Extra Urine Gray Tube In process    Urinalysis with Reflex Culture and Microscopic In process            Hospital Course   65-year-old female who presented with intermittent nonexertional chest pain that began while eating a sandwich at home.  No prior history of heart disease, no family history of heart disease, she exercises quite regularly and has not had any exertional chest pain, she walked 1.5 miles on the day of presentation to the emergency room without chest pain.  Troponins unremarkable, EKG nonischemic on admission and repeat EKG the morning of discharge similar to prior without ischemic changes.  Chest x-ray unremarkable, the rest of labs were unremarkable including a BNP and dimer.  EGD in 2023 showing gastritis and esophagitis, not on a PPI.    Reviewed telemetry in the morning, no events, EKG similar to prior in the morning without ischemic changes.  No  further episodes of chest pain.  Heart score 3, low risk.  2 points for age, 1 for dyslipidemia/risk factors. Reassuring history given that the patient exerts herself quite regularly without chest pain, suspicious for GI pathology given the initial episode happened while eating bread with a history of esophagitis.  Discussed plan of care with patient, will discharge home on Protonix to follow-up with her PCP and a referral was placed to a cardiologist for outpatient follow-up.    Pertinent Physical Exam At Time of Discharge  Physical Exam  Constitutional: Awake/alert/oriented x3  Eyes: Clear sclera  Head/Neck: Normocephalic, atraumatic  Respiratory/Thorax: CTAB  Cardiovascular: RRR, no murmurs auscultated  Gastrointestinal: Non-distended  Musculoskeletal: FROM  Extremities: No clubbing  Psychological: Appropriate mood and behavior  Skin: Warm and dry, no jaundice      Outpatient Follow-Up  Future Appointments   Date Time Provider Department Center   10/10/2025 10:00 AM Vitaly Boothe DO [id number] Jasper Bashir DO

## 2025-06-23 LAB — HOLD SPECIMEN: NORMAL

## 2025-06-24 ENCOUNTER — PATIENT OUTREACH (OUTPATIENT)
Dept: PRIMARY CARE | Facility: CLINIC | Age: 65
End: 2025-06-24
Payer: MEDICARE

## 2025-06-24 NOTE — PROGRESS NOTES
Discharge Facility: [facility]  Discharge Diagnosis: chest pain  Admission Date: 6/20/25  Discharge Date: 6/21/25    PCP Appointment Date: 7/1/25  Specialist Appointment Date: 7/23/25 Dr Gerard  Hospital Encounter and Summary Linked: Yes  Discharge Summary by Rogelio Bashir DO (06/21/2025 11:14)   See discharge assessment below for further details  Wrap Up  Wrap Up Additional Comments: Successful transition of care outreach with patient. Patient reports doing well at home since discharge. New meds/changes reviewed with patient during outreach. Patient denies CP/SOB/abdominal pain. Patient denies further discharge questions/concerns/needs at time of outreach call. Emphasized that follow up appts are needed after discharge with PCP and reviewed needed follow ups with any specialties to assess response to treatment from hospitalization. Patient aware of my availability for non-emergent concerns. Will be seeing Dr Gerard for follow up as well. (6/24/2025 12:40 PM)  Call End Time: 1236 (6/24/2025 12:40 PM)    Engagement  Call Start Time: 1230 (6/24/2025 12:40 PM)    Medications  Medications reviewed with patient/caregiver?: Yes (6/24/2025 12:40 PM)  Is the patient having any side effects they believe may be caused by any medication additions or changes?: No (6/24/2025 12:40 PM)  Does the patient have all medications ordered at discharge?: Yes (6/24/2025 12:40 PM)  Care Management Interventions: Provided patient education (6/24/2025 12:40 PM)  Prescription Comments: on pantoprazole daily (6/24/2025 12:40 PM)  Is the patient taking all medications as directed (includes completed medication regime)?: Yes (6/24/2025 12:40 PM)  Care Management Interventions: Provided patient education (6/24/2025 12:40 PM)  Medication Comments: No issues with medications (6/24/2025 12:40 PM)    Appointments  Does the patient have a primary care provider?: Yes (6/24/2025 12:40 PM)  Care Management Interventions: Verified appointment  date/time/provider (6/24/2025 12:40 PM)  Has the patient kept scheduled appointments due by today?: Yes (6/24/2025 12:40 PM)  Care Management Interventions: Advised patient to keep appointment (6/24/2025 12:40 PM)    Patient Teaching  Does the patient have access to their discharge instructions?: Yes (6/24/2025 12:40 PM)  Care Management Interventions: Reviewed instructions with patient (6/24/2025 12:40 PM)  What is the patient's perception of their health status since discharge?: Improving (6/24/2025 12:40 PM)  Is the patient/caregiver able to teach back the hierarchy of who to call/visit for symptoms/problems? PCP, Specialist, Home Health nurse, Urgent Care, ED, 911: Yes (6/24/2025 12:40 PM)  Patient/Caregiver Education Comments: Aware to seek care with any return of worsening pain (6/24/2025 12:40 PM)

## 2025-06-27 LAB
ATRIAL RATE: 70 BPM
P AXIS: 19 DEGREES
PR INTERVAL: 187 MS
Q ONSET: 249 MS
QRS COUNT: 12 BEATS
QRS DURATION: 86 MS
QT INTERVAL: 367 MS
QTC CALCULATION(BAZETT): 396 MS
QTC FREDERICIA: 386 MS
R AXIS: 26 DEGREES
T AXIS: 8 DEGREES
T OFFSET: 433 MS
VENTRICULAR RATE: 70 BPM

## 2025-07-01 ENCOUNTER — APPOINTMENT (OUTPATIENT)
Dept: PRIMARY CARE | Facility: CLINIC | Age: 65
End: 2025-07-01
Payer: MEDICARE

## 2025-07-01 VITALS
HEIGHT: 60 IN | TEMPERATURE: 97.9 F | OXYGEN SATURATION: 99 % | DIASTOLIC BLOOD PRESSURE: 80 MMHG | SYSTOLIC BLOOD PRESSURE: 116 MMHG | BODY MASS INDEX: 27.72 KG/M2 | HEART RATE: 70 BPM | WEIGHT: 141.2 LBS

## 2025-07-01 DIAGNOSIS — K21.9 GASTROESOPHAGEAL REFLUX DISEASE WITHOUT ESOPHAGITIS: ICD-10-CM

## 2025-07-01 DIAGNOSIS — E78.00 HYPERCHOLESTEREMIA: Primary | ICD-10-CM

## 2025-07-01 DIAGNOSIS — I10 BENIGN HYPERTENSION: ICD-10-CM

## 2025-07-01 DIAGNOSIS — E03.9 ADULT HYPOTHYROIDISM: ICD-10-CM

## 2025-07-01 PROBLEM — J20.9 ACUTE BRONCHITIS WITH BRONCHOSPASM: Status: ACTIVE | Noted: 2025-07-01
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
## 2025-07-01 PROBLEM — N39.0 URINARY TRACT INFECTIOUS DISEASE: Status: ACTIVE | Noted: 2024-08-30

## 2025-07-01 PROBLEM — R10.9 ABDOMINAL PAIN, ACUTE: Status: ACTIVE | Noted: 2025-01-03

## 2025-07-01 PROBLEM — L25.9 CONTACT DERMATITIS: Status: ACTIVE | Noted: 2025-07-01

## 2025-07-01 PROBLEM — R05.9 COUGH: Status: ACTIVE | Noted: 2025-07-01

## 2025-07-01 PROCEDURE — 1126F AMNT PAIN NOTED NONE PRSNT: CPT | Performed by: FAMILY MEDICINE

## 2025-07-01 PROCEDURE — 3008F BODY MASS INDEX DOCD: CPT | Performed by: FAMILY MEDICINE

## 2025-07-01 PROCEDURE — 99495 TRANSJ CARE MGMT MOD F2F 14D: CPT | Performed by: FAMILY MEDICINE

## 2025-07-01 PROCEDURE — 3074F SYST BP LT 130 MM HG: CPT | Performed by: FAMILY MEDICINE

## 2025-07-01 PROCEDURE — 3079F DIAST BP 80-89 MM HG: CPT | Performed by: FAMILY MEDICINE

## 2025-07-01 PROCEDURE — 1036F TOBACCO NON-USER: CPT | Performed by: FAMILY MEDICINE

## 2025-07-01 PROCEDURE — 1159F MED LIST DOCD IN RCRD: CPT | Performed by: FAMILY MEDICINE

## 2025-07-01 PROCEDURE — 1160F RVW MEDS BY RX/DR IN RCRD: CPT | Performed by: FAMILY MEDICINE

## 2025-07-01 ASSESSMENT — PATIENT HEALTH QUESTIONNAIRE - PHQ9
1. LITTLE INTEREST OR PLEASURE IN DOING THINGS: NOT AT ALL
1. LITTLE INTEREST OR PLEASURE IN DOING THINGS: NOT AT ALL
SUM OF ALL RESPONSES TO PHQ9 QUESTIONS 1 AND 2: 0
2. FEELING DOWN, DEPRESSED OR HOPELESS: NOT AT ALL
SUM OF ALL RESPONSES TO PHQ9 QUESTIONS 1 AND 2: 0
2. FEELING DOWN, DEPRESSED OR HOPELESS: NOT AT ALL

## 2025-07-01 ASSESSMENT — ENCOUNTER SYMPTOMS
NEUROLOGICAL NEGATIVE: 1
CONSTITUTIONAL NEGATIVE: 1
OCCASIONAL FEELINGS OF UNSTEADINESS: 0
DEPRESSION: 0
RESPIRATORY NEGATIVE: 1
LOSS OF SENSATION IN FEET: 0

## 2025-07-01 ASSESSMENT — PAIN SCALES - GENERAL: PAINLEVEL_OUTOF10: 0-NO PAIN

## 2025-07-01 NOTE — PROGRESS NOTES
Subjective   Patient ID: Jocelyne Barroso is a 65 y.o. female who presents for Hospital Follow-up ([facility]-/6/20/2025 heart issues).    HPI     Review of Systems   Constitutional: Negative.    HENT: Negative.     Respiratory: Negative.     Cardiovascular:         Patient was admitted overnight at [facility] secondary to chest pain it was determined this seemed to be most consistent with reflux esophagitis.  She was placed on Protonix she does have a follow-up appointment with cardiology on July 23 Dr Gerard   Neurological: Negative.        Objective   /80 (BP Location: Right arm)   Pulse 70   Temp 36.6 °C (97.9 °F) (Oral)   Ht (!) 1.524 m (5')   Wt 64 kg (141 lb 3.2 oz)   SpO2 99%   BMI 27.58 kg/m²     Physical Exam  Vitals and nursing note reviewed.   Constitutional:       Appearance: Normal appearance.   Cardiovascular:      Rate and Rhythm: Normal rate and regular rhythm.      Pulses: Normal pulses.      Heart sounds: Normal heart sounds.   Pulmonary:      Breath sounds: Normal breath sounds.   Abdominal:      Palpations: Abdomen is soft.   Neurological:      General: No focal deficit present.      Mental Status: She is alert and oriented to person, place, and time.   Psychiatric:         Mood and Affect: Mood normal.         Behavior: Behavior normal.         Assessment/Plan patient seen here for follow-up after having been admitted at [facility] from June 20 to June 21.  We reviewed her results from that visit.  We reviewed her medications pre and post admission.  She has upcoming appoint with cardiology at the end of this month.  Will see her back as needed  Problem List Items Addressed This Visit           ICD-10-CM    Benign hypertension I10    Hypercholesteremia - Primary E78.00    Adult hypothyroidism E03.9     Other Visit Diagnoses         Codes      Gastroesophageal reflux disease without esophagitis     K21.9

## 2025-07-03 ENCOUNTER — PATIENT OUTREACH (OUTPATIENT)
Dept: PRIMARY CARE | Facility: CLINIC | Age: 65
End: 2025-07-03
Payer: MEDICARE

## 2025-07-03 NOTE — PROGRESS NOTES
Confirmation of at least 2 patient identifiers.    Completed telephonic follow-up with patient after recent visit with Dr Boothe    Spoke to patient during outreach call.    Patient reports feeling: Improved    Patient has questions or concerns about medications: No    Have all prescribed medications been filled? Yes    Patient has necessary resources to manage their care? Yes    Patient has questions or concerns? No    Next care management follow-up approximately within one month.  Care  information provided to patient.

## 2025-08-08 ENCOUNTER — PATIENT OUTREACH (OUTPATIENT)
Dept: PRIMARY CARE | Facility: CLINIC | Age: 65
End: 2025-08-08
Payer: MEDICARE

## 2025-10-10 ENCOUNTER — APPOINTMENT (OUTPATIENT)
Dept: PRIMARY CARE | Facility: CLINIC | Age: 65
End: 2025-10-10
Payer: MEDICARE